# Patient Record
Sex: FEMALE | Race: WHITE | NOT HISPANIC OR LATINO | URBAN - METROPOLITAN AREA
[De-identification: names, ages, dates, MRNs, and addresses within clinical notes are randomized per-mention and may not be internally consistent; named-entity substitution may affect disease eponyms.]

---

## 2019-07-10 ENCOUNTER — INITIAL PRENATAL (OUTPATIENT)
Dept: OBGYN | Facility: CLINIC | Age: 24
End: 2019-07-10
Payer: MEDICAID

## 2019-07-10 VITALS — WEIGHT: 163 LBS | DIASTOLIC BLOOD PRESSURE: 66 MMHG | SYSTOLIC BLOOD PRESSURE: 102 MMHG

## 2019-07-10 DIAGNOSIS — N93.8 DUB (DYSFUNCTIONAL UTERINE BLEEDING): ICD-10-CM

## 2019-07-10 DIAGNOSIS — Z32.01 POSITIVE PREGNANCY TEST: ICD-10-CM

## 2019-07-10 LAB
IN CLINIC OB SCAN: NORMAL
INT CON NEG: NEGATIVE
INT CON POS: POSITIVE
POC URINE PREGNANCY TEST: POSITIVE

## 2019-07-10 PROCEDURE — 76857 US EXAM PELVIC LIMITED: CPT | Performed by: OBSTETRICS & GYNECOLOGY

## 2019-07-10 PROCEDURE — 99203 OFFICE O/P NEW LOW 30 MIN: CPT | Mod: 25 | Performed by: OBSTETRICS & GYNECOLOGY

## 2019-07-10 PROCEDURE — 81025 URINE PREGNANCY TEST: CPT | Performed by: OBSTETRICS & GYNECOLOGY

## 2019-07-10 NOTE — PROGRESS NOTES
CC: Confirmation of Pregnancy    HPI: Pt is a 22 yo  lmp unknown who presents for evaluation of amenorrhea.  She has had no bleeding since her last menses.  A urine pregnancy test was positive.  She denies vaginal spotting or pain.  She denies nausea and vomiting.  She has been feeling the baby move for 2 weeks.       ROS: negative for dizziness, SOB, chest pain, palpitations, dysuria, vaginal discharge.    /66   Wt 73.9 kg (163 lb)     GENERAL: Alert, in no apparent distress  PSYCHIATRIC: Appropriate affect, intact insight and judgement.  ABDOMEN: Soft, nontender, nondistended.  Fundus palpable at umbillicus. No hepatosplenomegaly.   No rebound or guarding.  No inguinal lymphadenopathy.  BACK: No CVA tenderness  EXTREMITIES: No edema, no calf tenderness.    GENITOURINARY:  Normal external genitalia, no lesions.  Normal urethral meatus, no masses or tenderness.  Normal bladder without fullness or masses.  Vagina well estrogenized, no vaginal discharge or lesions.  Cervix normal length, nontender.  Uterus 20 weeks, nontender.  Adnexa nontender, no masses.  Normal anus and perineum.      Limited Abdominal US - performed and interpreted by me    Single gestational sac.  Placenta posterior    [place] IUP with + fetal cardiac activity noted.    BPD = 4.52 cm, AC = 13.86 cm, FL = 3.30 cm, C/W 19+5/7 wks.    KUNAL by US 19    Ovaries and cervix grossly normal. Cervical length 3.98 cm      ASSESSMENT/PLAN:  1. DUB visit - [place] IUP at 19+5/7 wks.  Prenatal Vitamins.  F/U for NOB appointment next available..

## 2019-07-10 NOTE — PROGRESS NOTES
Pt here today for   Pt states no complaints  Reports +  Good # [phone]  Pharmacy Confirmed.

## 2019-07-19 ENCOUNTER — APPOINTMENT (OUTPATIENT)
Dept: OBGYN | Facility: CLINIC | Age: 24
End: 2019-07-19

## 2019-08-12 ENCOUNTER — INITIAL PRENATAL (OUTPATIENT)
Dept: OBGYN | Facility: CLINIC | Age: 24
End: 2019-08-12

## 2019-08-12 ENCOUNTER — TELEPHONE (OUTPATIENT)
Dept: OBGYN | Facility: CLINIC | Age: 24
End: 2019-08-12

## 2019-08-12 ENCOUNTER — HOSPITAL ENCOUNTER (OUTPATIENT)
Dept: LAB | Facility: MEDICAL CENTER | Age: 24
End: 2019-08-12
Attending: NURSE PRACTITIONER
Payer: COMMERCIAL

## 2019-08-12 ENCOUNTER — HOSPITAL ENCOUNTER (OUTPATIENT)
Facility: MEDICAL CENTER | Age: 24
End: 2019-08-12
Attending: NURSE PRACTITIONER
Payer: COMMERCIAL

## 2019-08-12 VITALS — WEIGHT: 165 LBS | SYSTOLIC BLOOD PRESSURE: 104 MMHG | DIASTOLIC BLOOD PRESSURE: 64 MMHG

## 2019-08-12 DIAGNOSIS — Z34.82 ENCOUNTER FOR SUPERVISION OF OTHER NORMAL PREGNANCY IN SECOND TRIMESTER: ICD-10-CM

## 2019-08-12 LAB
APPEARANCE UR: CLEAR
BILIRUB UR STRIP-MCNC: NORMAL MG/DL
COLOR UR AUTO: YELLOW
ERYTHROCYTE [DISTWIDTH] IN BLOOD BY AUTOMATED COUNT: 48.5 FL (ref 35.9–50)
GLUCOSE 1H P 50 G GLC PO SERPL-MCNC: 92 MG/DL (ref 70–139)
GLUCOSE UR STRIP.AUTO-MCNC: NORMAL MG/DL
HCT VFR BLD AUTO: 38 % (ref 37–47)
HGB BLD-MCNC: 12.6 G/DL (ref 12–16)
KETONES UR STRIP.AUTO-MCNC: NORMAL MG/DL
LEUKOCYTE ESTERASE UR QL STRIP.AUTO: NORMAL
MCH RBC QN AUTO: 32.2 PG (ref 27–33)
MCHC RBC AUTO-ENTMCNC: 33.2 G/DL (ref 33.6–35)
MCV RBC AUTO: 97.2 FL (ref 81.4–97.8)
NITRITE UR QL STRIP.AUTO: NORMAL
PH UR STRIP.AUTO: 7 [PH] (ref 5–8)
PLATELET # BLD AUTO: 251 K/UL (ref 164–446)
PMV BLD AUTO: 10.3 FL (ref 9–12.9)
PROT UR QL STRIP: NORMAL MG/DL
RBC # BLD AUTO: 3.91 M/UL (ref 4.2–5.4)
RBC UR QL AUTO: NORMAL
SP GR UR STRIP.AUTO: 1.02
TREPONEMA PALLIDUM IGG+IGM AB [PRESENCE] IN SERUM OR PLASMA BY IMMUNOASSAY: NON REACTIVE
UROBILINOGEN UR STRIP-MCNC: NORMAL MG/DL
WBC # BLD AUTO: 12.3 K/UL (ref 4.8–10.8)

## 2019-08-12 PROCEDURE — 8198 PREG CTR PKG RATE (SYSTEM): Performed by: NURSE PRACTITIONER

## 2019-08-12 PROCEDURE — 81002 URINALYSIS NONAUTO W/O SCOPE: CPT | Performed by: NURSE PRACTITIONER

## 2019-08-12 PROCEDURE — 0500F INITIAL PRENATAL CARE VISIT: CPT | Performed by: NURSE PRACTITIONER

## 2019-08-12 ASSESSMENT — ENCOUNTER SYMPTOMS
GASTROINTESTINAL NEGATIVE: 1
EYES NEGATIVE: 1
CARDIOVASCULAR NEGATIVE: 1
NEUROLOGICAL NEGATIVE: 1
RESPIRATORY NEGATIVE: 1
PSYCHIATRIC NEGATIVE: 1
CONSTITUTIONAL NEGATIVE: 1
MUSCULOSKELETAL NEGATIVE: 1

## 2019-08-12 NOTE — NON-PROVIDER
Patient here for GYN/DUB.  LMP= 02/21/2019   KUNAL= 11/28/2019  GA= 24 wk  Last pap:2 or 3 years ago  Phone number: [phone]  Pharmacy verified  c/o  WT: 165 lbs  BP:104/64

## 2019-08-12 NOTE — TELEPHONE ENCOUNTER
[facility] lab called states pt was drawn for her pallidum but her order is wrong.  I will pend correct order for provider to sign

## 2019-08-12 NOTE — LETTER
[facility]  Pcp Pt States None  No address on file  Fax: [phone]   Authorization for Release/Disclosure of   Protected Health Information   Name: VINNY HAYDEN : 1995 SSN: [id number]   Address: [address]  [place] [address] Phone:    [phone] (home)    I authorize the entity listed below to release/disclose the PHI below to:   [facility]/Pcp Pt States None and GALO Sosa   Provider or Entity Name:     Address   City, State, [facility]   Phone:      Fax:     Reason for request: continuity of care   Information to be released:    [  ] LAST COLONOSCOPY,  including any PATH REPORT and follow-up  [  ] LAST FIT/COLOGUARD RESULT [  ] LAST DEXA  [  ] LAST MAMMOGRAM  [  ] LAST PAP  [  ] LAST LABS [  ] RETINA EXAM REPORT  [  ] IMMUNIZATION RECORDS  [  ] Release all info      [  ] Check here and initial the line next to each item to release ALL health information INCLUDING  _____ Care and treatment for drug and / or alcohol abuse  _____ HIV testing, infection status, or AIDS  _____ Genetic Testing    DATES OF SERVICE OR TIME PERIOD TO BE DISCLOSED: _____________  I understand and acknowledge that:  * This Authorization may be revoked at any time by you in writing, except if your health information has already been used or disclosed.  * Your health information that will be used or disclosed as a result of you signing this authorization could be re-disclosed by the recipient. If this occurs, your re-disclosed health information may no longer be protected by State or Federal laws.  * You may refuse to sign this Authorization. Your refusal will not affect your ability to obtain treatment.  * This Authorization becomes effective upon signing and will  on (date) __________.      If no date is indicated, this Authorization will  one (1) year from the signature date.    Name: Vinny Hayden    Signature:   Date:     2019       PLEASE FAX REQUESTED RECORDS BACK TO: [phone]

## 2019-08-12 NOTE — PROGRESS NOTES
Subjective:      S:  Gypsy Hayden is a 23 y.o.  female  @ EGA: 24w3d KUNAL: Estimated Date of Delivery: 19  per  who presents for her new OB exam. She states she has gotten some prenatal care in [facility], pnp and early 1 hr GTT were drawn. Release of records signed. She has no complaints.  Too late for AFP, unsure if part of her initial pnp.  Declines CF.  Reports positive FM, no VB, LOF, or cramping.  Denies dysuria, vaginal DC.  Pt is single and lives with FOB. Works as , lifting restrictions discussed.  Pregnancy is unplanned but desired.  Not appropriate for Centering Pregnancy.    O:    Vitals:    19 1439   BP: 104/64   Weight: 74.8 kg (165 lb)    See H&P Prenatal Physical.  Wet mount: not indicated        FHTs: 150        Fundal ht: 24cm     A:   1.  IUP @ 24w3d KUNAL: Estimated Date of Delivery: 19 per          2.  S=D        3.    Patient Active Problem List    Diagnosis Date Noted   • Routine postpartum follow-up 03/15/2011         P:  1.  GC/CT done. Pap done.         2.  Prenatal labs pending release of records from [facility]        3.  Discussed PNV, diet, and adequate water intake        4.  NOB packet given        5.  Return to office in 4 wks        6.  Complete OB US asap    HPI    Review of Systems   Constitutional: Negative.    HENT: Negative.    Eyes: Negative.    Respiratory: Negative.    Cardiovascular: Negative.    Gastrointestinal: Negative.    Genitourinary: Negative.         Uterus enlarged, c/w 24 wk ga   Musculoskeletal: Negative.    Skin: Negative.    Neurological: Negative.    Endo/Heme/Allergies: Negative.    Psychiatric/Behavioral: Negative.           Objective:     /64   Wt 74.8 kg (165 lb)      Physical Exam   Constitutional: She is oriented to person, place, and time. She appears well-developed and well-nourished.   Neck: Normal range of motion. Neck supple.   Cardiovascular: Normal rate, regular rhythm and normal  heart sounds.   Pulmonary/Chest: Effort normal and breath sounds normal.   Abdominal: Soft.   Genitourinary: Vagina normal and uterus normal.   Musculoskeletal: Normal range of motion.   Neurological: She is alert and oriented to person, place, and time. She has normal reflexes.   Skin: Skin is warm and dry.   Psychiatric: She has a normal mood and affect. Her behavior is normal. Judgment and thought content normal.   Nursing note and vitals reviewed.              Assessment/Plan:     1. Encounter for supervision of other normal pregnancy in second trimester    - US-OB 2ND 3RD TRI COMPLETE; Future  - Pap IG, CT-NG, RFX HPV ASCU  - GLUCOSE 1HR GESTATIONAL; Future  - CBC WITHOUT DIFFERENTIAL; Future  - TREPONEMA PALLIDUM ANTIBODY, IGG BY IFA (CSF); Future

## 2019-08-13 LAB
FORWARD REASON: SPWHY: NORMAL
FORWARDED TO LAB: SPWHR: NORMAL
SPECIMEN SENT: SPWT1: NORMAL

## 2019-08-16 LAB
C TRACH RRNA CVX QL NAA+PROBE: NEGATIVE
CYTOLOGIST CVX/VAG CYTO: NORMAL
CYTOLOGY CVX/VAG DOC CYTO: NORMAL
CYTOLOGY CVX/VAG DOC THIN PREP: NORMAL
DOT  190119: NORMAL
DX ICD CODE: NORMAL
N GONORRHOEA RRNA CVX QL NAA+PROBE: NEGATIVE
NOTE  190109: NORMAL
OTHER STN SPEC: NORMAL
STAT OF ADQ CVX/VAG CYTO-IMP: NORMAL

## 2019-09-17 ENCOUNTER — ROUTINE PRENATAL (OUTPATIENT)
Dept: OBGYN | Facility: CLINIC | Age: 24
End: 2019-09-17

## 2019-09-17 VITALS — SYSTOLIC BLOOD PRESSURE: 128 MMHG | DIASTOLIC BLOOD PRESSURE: 78 MMHG | WEIGHT: 174 LBS

## 2019-09-17 DIAGNOSIS — Z34.80 SUPERVISION OF OTHER NORMAL PREGNANCY: Primary | ICD-10-CM

## 2019-09-17 PROCEDURE — 90715 TDAP VACCINE 7 YRS/> IM: CPT | Performed by: NURSE PRACTITIONER

## 2019-09-17 PROCEDURE — 90472 IMMUNIZATION ADMIN EACH ADD: CPT | Performed by: NURSE PRACTITIONER

## 2019-09-17 PROCEDURE — 90471 IMMUNIZATION ADMIN: CPT | Performed by: NURSE PRACTITIONER

## 2019-09-17 PROCEDURE — 90686 IIV4 VACC NO PRSV 0.5 ML IM: CPT | Performed by: NURSE PRACTITIONER

## 2019-09-17 PROCEDURE — 90040 PR PRENATAL FOLLOW UP: CPT | Performed by: NURSE PRACTITIONER

## 2019-09-17 NOTE — PROGRESS NOTES
Pt here today for OB follow up  Pt states no complaints  Reports +FM  Pharmacy Confirmed.  Chaperone offered and declined  tdap and flu today

## 2019-09-17 NOTE — LETTER
"Count Your Baby's Movements  Another step to a healthy delivery    A Epic Dress Re Test             Dept: [phone]    How Many Weeks Pregnant? 29w4d    Date to Begin Countin2019                How to use this chart    One way for your physician to keep track of your baby's health is by knowing how often the baby moves (or \"kicks\") in your womb.  You can help your physician to do this by using this chart every day.    Every day, you should see how many hours it takes for your baby to move 10 times.  Start in the morning, as soon as you get up.    · First, write down the time your baby moves until you get to 10.  · Check off one box every time your baby moves until you get to 10.  · Write down the time you finished counting in the last column.  · Total how long it took to count up all 10 movements.  · Finally, fill in the box that shows how long this took.  After counting 10 movements, you no longer have to count any more that day.  The next morning, just start counting again as soon as you get up.    What should you call a \"movement\"?  It is hard to say, because it will feel different from one mother to another and from one pregnancy to the next.  The important thing is that you count the movements the same way throughout your pregnancy.  If you have more questions, you should ask your physician.    Count carefully every day!  SAMPLE:  Week 28    How many hours did it take to feel 10 movements?       Start  Time     1     2     3     4     5     6     7     8     9     10   Finish Time   Mon 8:20 ·  ·  ·  ·  ·  ·  ·  ·  ·  ·  11:40                  Sat               Sun                 IMPORTANT: You should contact your physician if it takes more than two hours for you to feel 10 movements.  Each morning, write down the time and start to count the movements of your baby.  Keep track by checking off one box every time you feel one movement.  When you " "have felt 10 \"kicks\", write down the time you finished counting in the last column.  Then fill in the   box (over the check patricia) for the number of hours it took.  Be sure to read the complete instructions on the previous page.            "

## 2019-09-17 NOTE — PROGRESS NOTES
S:  Pt is  at 29w4d for routine OB follow up.  No c/o today.  Reports good FM.  Denies VB, LOF, RUCs or vaginal DC.    O:  Please see above vitals.        FHTs: 155        Fundal ht: 29 cm.    A:  IUP at 29w4d  Patient Active Problem List    Diagnosis Date Noted   • Supervision of other normal pregnancy 2019        P:  1.  PP contraception Nexplanon.          2.  Instructions given on FKCs.          3.  Questions answered.          4.  Encouraged pt to tour L&D.          5.  Encourage adequate water intake.        6.  1hr GTT, syphilis and H/H wnl - reviewed w pt.        7.   labor precautions reviewed.         8.  F/u 2 wks.        9.  TDap and flu given.      10.  Keep US appt tomorrow.       11.  Records requested again.    I have placed the below orders and discussed them with an approved delegating provider. The MA is performing the below orders under the direction of  Dr. Singh.

## 2019-09-17 NOTE — PATIENT INSTRUCTIONS
P:  1.  PP contraception Nexplanon.          2.  Instructions given on FKCs.          3.  Questions answered.          4.  Encouraged pt to tour L&D.          5.  Encourage adequate water intake.        6.  1hr GTT, syphilis and H/H wnl - reviewed w pt.        7.   labor precautions reviewed.         8.  F/u 2 wks.        9.  TDap and flu given.      10.  Keep US appt tomorrow.       11.  Records requested again.

## 2019-09-18 ENCOUNTER — APPOINTMENT (OUTPATIENT)
Dept: RADIOLOGY | Facility: IMAGING CENTER | Age: 24
End: 2019-09-18
Attending: NURSE PRACTITIONER

## 2019-09-18 DIAGNOSIS — Z34.82 ENCOUNTER FOR SUPERVISION OF OTHER NORMAL PREGNANCY IN SECOND TRIMESTER: ICD-10-CM

## 2019-09-18 PROCEDURE — 76805 OB US >/= 14 WKS SNGL FETUS: CPT | Performed by: OBSTETRICS & GYNECOLOGY

## 2019-10-01 ENCOUNTER — ROUTINE PRENATAL (OUTPATIENT)
Dept: OBGYN | Facility: CLINIC | Age: 24
End: 2019-10-01

## 2019-10-01 VITALS — SYSTOLIC BLOOD PRESSURE: 126 MMHG | DIASTOLIC BLOOD PRESSURE: 80 MMHG | WEIGHT: 178 LBS

## 2019-10-01 DIAGNOSIS — Z34.80 SUPERVISION OF OTHER NORMAL PREGNANCY: Primary | ICD-10-CM

## 2019-10-01 DIAGNOSIS — O98.113 SYPHILIS IN PREGNANCY, ANTEPARTUM, THIRD TRIMESTER: ICD-10-CM

## 2019-10-01 PROCEDURE — 90040 PR PRENATAL FOLLOW UP: CPT | Performed by: NURSE PRACTITIONER

## 2019-10-01 NOTE — PROGRESS NOTES
"S:  Pt is  at 31w4d for routine OB follow up.  No c/o today.  Reports that she was tested for STIs at her prior doctor's office.  She reports she was told that she was \"non-reactive\" for \"something\" but \"positive\" for something else.  Reports that her provider gave her a shot to treat.  Reports she was supposed to have f/u bloodwork done, but moved.  .  Reports good FM.  Denies VB, LOF, RUCs or vaginal DC.    O:  Please see above vitals.        FHTs: 150        Fundal ht: 31 cm.    A:  IUP at 31w4d  Patient Active Problem List    Diagnosis Date Noted   • Supervision of other normal pregnancy 2019        P:  1.  GBS @ 36 wks.          2.  Continue FKCs.          3.  Questions answered.          4.  Encouraged pt to tour L&D.          5.  Encourage adequate water intake.        6.  F/u 2 wks.        7.  Records requested.    "

## 2019-10-01 NOTE — PROGRESS NOTES
Pt. Here for OB/FU. Reports Good FM.   Kick count sheet given today along with instructions.   Good # [phone]  Pt. State no complaints.   Pharmacy verified.

## 2019-10-01 NOTE — PATIENT INSTRUCTIONS
P:  1.  GBS @ 36 wks.          2.  Continue FKCs.          3.  Questions answered.          4.  Encouraged pt to tour L&D.          5.  Encourage adequate water intake.        6.  F/u 2 wks.        7.  Records requested.

## 2019-10-01 NOTE — LETTER
"Count Your Baby's Movements  Another step to a healthy delivery    Gypsy Catracho              Dept: [phone]    How Many Weeks Pregnant 31w4d    Date to Begin Counting: 10/1/19              How to use this chart    One way for your physician to keep track of your baby's health is by knowing how often the baby moves (or \"kicks\") in your womb.  You can help your physician to do this by using this chart every day.    Every day, you should see how many hours it takes for your baby to move 10 times.  Start in the morning, as soon as you get up.    · First, write down the time your baby moves until you get to 10.  · Check off one box every time your baby moves until you get to 10.  · Write down the time you finished counting in the last column.  · Total how long it took to count up all 10 movements.  · Finally, fill in the box that shows how long this took.  After counting 10 movements, you no longer have to count any more that day.  The next morning, just start counting again as soon as you get up.    What should you call a \"movement\"?  It is hard to say, because it will feel different from one mother to another and from one pregnancy to the next.  The important thing is that you count the movements the same way throughout your pregnancy.  If you have more questions, you should ask your physician.    Count carefully every day!  SAMPLE:  Week 28    How many hours did it take to feel 10 movements?       Start  Time     1     2     3     4     5     6     7     8     9     10   Finish Time   Mon 8:20 ·  ·  ·  ·  ·  ·  ·  ·  ·  ·  11:40   Tue Wed Thu Fri               Sat               Sun                 IMPORTANT: You should contact your physician if it takes more than two hours for you to feel 10 movements.  Each morning, write down the time and start to count the movements of your baby.  Keep track by checking off one box every time you feel one movement.  When you have felt " "10 \"kicks\", write down the time you finished counting in the last column.  Then fill in the   box (over the check patricia) for the number of hours it took.  Be sure to read the complete instructions on the previous page.            "

## 2019-10-15 ENCOUNTER — ROUTINE PRENATAL (OUTPATIENT)
Dept: OBGYN | Facility: CLINIC | Age: 24
End: 2019-10-15

## 2019-10-15 VITALS — DIASTOLIC BLOOD PRESSURE: 76 MMHG | WEIGHT: 179 LBS | SYSTOLIC BLOOD PRESSURE: 124 MMHG

## 2019-10-15 DIAGNOSIS — Z34.80 SUPERVISION OF OTHER NORMAL PREGNANCY: ICD-10-CM

## 2019-10-15 PROCEDURE — 90040 PR PRENATAL FOLLOW UP: CPT | Performed by: PHYSICIAN ASSISTANT

## 2019-10-15 NOTE — PROGRESS NOTES
Pt has no complaints with cramping, UCs, Vb, LOF. +FM. Pt has had vag itching, likely some swelling in labia, so will try cool packs. Wet mt: Neg. PTL precautions reviewed. No PNL on record, even after requesting last visit, so will have pt repeat today. Declines BTL. RTC 2 wk or sooner prn.

## 2019-10-15 NOTE — PROGRESS NOTES
OB follow up   + fetal movement.  No VB, LOF or UC's.  Wt: 179lb      BP:124/76Phone # [phone]  Preferred pharmacy confirmed.  BTL declined  C/ vaginal itching.  No previous records received yet.

## 2019-10-17 ENCOUNTER — HOSPITAL ENCOUNTER (OUTPATIENT)
Facility: MEDICAL CENTER | Age: 24
End: 2019-10-17
Attending: OBSTETRICS & GYNECOLOGY | Admitting: OBSTETRICS & GYNECOLOGY
Payer: MEDICAID

## 2019-10-17 VITALS
RESPIRATION RATE: 16 BRPM | SYSTOLIC BLOOD PRESSURE: 116 MMHG | DIASTOLIC BLOOD PRESSURE: 78 MMHG | TEMPERATURE: 97.8 F | HEART RATE: 98 BPM | OXYGEN SATURATION: 94 %

## 2019-10-17 LAB
APPEARANCE UR: CLEAR
COLOR UR AUTO: YELLOW
GLUCOSE UR QL STRIP.AUTO: NEGATIVE MG/DL
KETONES UR QL STRIP.AUTO: NEGATIVE MG/DL
LEUKOCYTE ESTERASE UR QL STRIP.AUTO: NEGATIVE
NITRITE UR QL STRIP.AUTO: NEGATIVE
PH UR STRIP.AUTO: 7 [PH] (ref 5–8)
PROT UR QL STRIP: NEGATIVE MG/DL
RBC UR QL AUTO: NEGATIVE
SP GR UR: 1.01 (ref 1–1.03)

## 2019-10-17 PROCEDURE — 99213 OFFICE O/P EST LOW 20 MIN: CPT | Mod: 25 | Performed by: OBSTETRICS & GYNECOLOGY

## 2019-10-17 PROCEDURE — 59025 FETAL NON-STRESS TEST: CPT | Performed by: OBSTETRICS & GYNECOLOGY

## 2019-10-17 PROCEDURE — 59025 FETAL NON-STRESS TEST: CPT

## 2019-10-17 PROCEDURE — 81002 URINALYSIS NONAUTO W/O SCOPE: CPT

## 2019-10-17 NOTE — DISCHARGE INSTRUCTIONS
General Instructions:  · If you think you are in labor, time contractions (lying on your left side) from the beginning of one contraction to the beginning of the next contraction for at least one hour.  · Increase fluid intake: you should consume 10-12 8 oz glasses of non-caffeinated fluid per day.  · Report any pressure or burning on urination to your physician.  · Monitor fetal movement: If you notice an absence or decrease in fetal movement, drink a large glass of water and rest on your side.  If there is no increase in movement, call your physician or go to the hospital for further evaluation.  · Report any sudden, sharp abdominal pain.  · Report any bleeding.  Spotting or pinkish discharge is normal after vaginal exam.  You may also spot after sexual intercourse.    Pre-term Labor (<37 weeks):  Call your physician or return to the hospital if:  · You have painless regular contractions more than 4 in one hour.  · Your water breaks (remember time and color).  · You have menstrual-like cramps, a low dull backache or pressure in your pelvis or back.  · Your baby does not move enough to complete the daily kick count (10 movements in 2 hours).  · Your baby moves much less often than on the days before or you have not felt your baby move all day.  · Please review the MEDICATION LIST section of your AFTER VISIT SUMMARY document.  · Take your medication as prescribed    May take Robitussin or Robitussin DM for cough, cold medications with Tylenol, stay hydrated, try tea and honey for sore throat  Other Instructions:  Please carefully review your entire AFTER VISIT SUMMARY document for all discharge instructions.

## 2019-10-17 NOTE — PROGRESS NOTES
EDC  32      1300-pt presents from home with c/o SOB and transient dizziness at work, she also states that she has a cough and some cold s/s, no c/o leaking, bleeding, pain or uc's, states baby is moving but less than normal, placed on external monitors, vs taken, lung fields all sound clear  1315-report given to Dr Gee, will be in to see pt  1320-Dr Gee in room with resident, assessment done, wants pt to hydrate orally, discussed with pt cold treatments  1405-pt feeling better, feeling normal movement now, report given to Dr Gee, discharge order received  1410-pt discharged home with PTL precautions and reviewed what she may take for a cold, verbalized understanding, left ambulatory with SO

## 2019-10-17 NOTE — H&P
Obstetrics & Gynecology History & Physical Note    Date of Service  10/17/2019    Chief Complaint  Dizziness and shortness of breath at work    History of Presenting Illness  Gypsy Hitchcock is a 24 y.o.  at 33w6d 2019, by Ultrasound. No LMP recorded. Patient is pregnant.  Patient presents with complaints of shortness of breath and transient episode of dizziness at work.  Currently she states most of her symptoms have resolved.  She reports some chest congestion symptoms and states she has had some cold symptoms for the past 2 to 3 days.  She denies any fevers.  She has a dry cough.  She reports normal fetal movements with no contractions cramping bleeding or leaking.  Denies headaches or scotoma.  She denies any chest pain.  Patient is currently lying comfortably in bed without any signs of distress.    Prenatal care is at [facility] and is complicated by:  1.     Patient Active Problem List    Diagnosis Date Noted   • Syphilis in pregnancy, antepartum, third trimester 10/01/2019   • Supervision of other normal pregnancy 2019       Obstetric History  OB History    Para Term  AB Living   2 1 1     1   SAB TAB Ectopic Molar Multiple Live Births             1      # Outcome Date GA Lbr Andre/2nd Weight Sex Delivery Anes PTL Lv   2 Current            1 Term 11 39w5d  3.558 kg (7 lb 13.5 oz) F Vag-Spont OTHER N ESTELLA      Birth Comments:  cervical lac repaired 1 stitch, 3rd degree perineal lac repaired.       Gynecologic History  negative    Review of Systems  ROS all organ systems were reviewed and were negative except for complaints in HPI    Medical History   has no past medical history of Addisons disease ([facility]), Adrenal disorder (HCC), Allergy, Anemia, Anxiety, Arrhythmia, Arthritis, Asthma, Blood transfusion without reported diagnosis, Cancer ([facility]), Cataract, CHF (congestive heart failure) ([facility]), Clotting disorder (HCC), COPD (chronic obstructive pulmonary disease) ([facility]), Cushings  syndrome (HCC), Depression, Diabetes (HCC), Diabetic neuropathy (HCC), GERD (gastroesophageal reflux disease), Glaucoma, Goiter, Head ache, Heart attack (HCC), Heart murmur, HIV (human immunodeficiency virus infection) (HCC), Hyperlipidemia, Hypertension, IBD (inflammatory bowel disease), Kidney disease, Meningitis, Migraine, Muscle disorder, Osteoporosis, Parathyroid disorder (HCC), Pituitary disease (HCC), Pulmonary emphysema (HCC), Seizure (HCC), Sickle cell disease (HCC), Stroke (HCC), Substance abuse (HCC), Thyroid disease, Tuberculosis, or Urinary tract infection.    Surgical History   has no past surgical history on file.     Family History  family history is not on file.     Social History   reports that she has never smoked. She has never used smokeless tobacco. She reports that she does not drink alcohol or use drugs.    Allergies  No Known Allergies    Medications  Prior to Admission Medications   Prescriptions Last Dose Informant Patient Reported? Taking?   Prenatal Vit-Fe Sulfate-FA (PRENATAL VITAMIN PO)   Yes No   Sig: Take  by mouth.      Facility-Administered Medications: None       Physical Exam  T97.8 P98 RR16 Pulse Ox 94-97 %    General:   no acute distress, alert, cooperative, no distress   Skin:   normal   HEENT:  PERRLA and extraocular movements intact   Lungs:   CTA bilateral, clear to auscultation bilaterally   Heart:   S1, S2 normal, no murmur, click, rub or gallop, regular rate and rhythm, regular rate and rhythm   Breasts:   deferred   Abdomen:  Abdomen soft, non-tender; gravid.   Pelvis: Exam deferred.   Neck: + lymphadenopathy  Throat: + erythema  EXT: no c/c/e or calf pain  Neuro: no gross deficits    NST:  NST per my read:  Indications: shortness of breath/dizziness    FHR baseline: 140s  Accelerations present without decelerations  Moderate FHR variability present  [place]: no is a residentcontractions noted  NST is reactive    Laboratory:  Prenatal Results     General (Most Recent  Result)     Test Value Reference Range Date Time    ABO O   08/20/10 1114    Rh POS   08/20/10 1114    Antibody screen NEG   08/20/10 1114    HbA1c        Gonorrhea        Chlamydia  by PCR        Chlamydia by DNA negative   08/23/11     RPR/Syphilus Non Reactive  Non Reactive 08/12/19 1633    HSV 1/2 by PCR (non-serum)        HSV 1/2 (serum)        HSV 1        HSV 2        HPV (16)        HPV (18)        HPV (other)        HBsAg Negative  Negative 08/20/10 1114    HIV-1 HIV-2 Antibodies        Rubella 48.20 IU/mL  08/20/10 1114    Tb              Pap Smear (Most Recent Result)     Test Value Reference Range Date Time    Pap smear        Pap smear w/HPV        Pap smear w/CTNG        Pap smar w/HPV CTNG        Pap smear (reflex HPV ACUS)        Pap smear (reflex HPV ASCUS w/CTNG)        Pathology gyn specimen              Urinalysis (Most Recent Result)     Test Value Reference Range Date Time    Urinalysis        Urinalysis (culture if indicated)        POC urinalysis  (See Report)    08/12/19 1448    Urine drug screen        Urine drug screen (w/o conf)        Urine culture ([id number])        Urine culture ([id number])              1st Trimester     Test Value Reference Range Date Time    Hgb        Hct        Fasting Glucose Tolerance        GTT, 1 hour        GTT, 2 hours        GTT, 3 hours              2nd Trimester     Test Value Reference Range Date Time    Hgb 12.6 g/dL 12.0 - 16.0 08/12/19 1633    Hct 38.0 % 37.0 - 47.0 08/12/19 1633    Urinalysis        Urine Culture        AST        ALT        Uric Acid        Fasting Glucose Tolerance        GTT, 1 hour        GTT, 2 hours        GTT, 3 hours        Urine Protein/Creatinine Ratio              3rd Trimester     Test Value Reference Range Date Time    Hgb        Hct        Platelet count        GBS (SEXTON BROTH)        GBS (Direct)        Urinalysis        Urine Culture        Urine Drug Screen        Urine Protein/Creatinine Ratio              Congenital  Disease Screening     Test Value Reference Range Date Time    First Trimester Screen        Quad Screen        Sickle Cell        Thalassemia        [facility]        Cystic Fibrosis Carrier Study                      Urinalysis:    Urine analysis is negative          Assessment:  24 y.o.  33w6d who presents with:  Transient dizziness-now resolved  Transient shortness of breath-symptoms resolved  Common cold symptoms.  Patient is afebrile  Reactive nonstress test    Plan:  Discharge planning and precautions were reviewed  We discussed use of common cold products for symptoms  We discussed physiologic changes in pregnancy and associated symptoms  I reviewed abnormal symptoms and signs of distress that needs evaluation  Patient discharged in a stable fashion  Patient has appointment in pregnancy center this week  Patient to return to labor and delivery if symptoms worsens    Gurdeep Gee M.D.

## 2019-10-22 ENCOUNTER — HOSPITAL ENCOUNTER (OUTPATIENT)
Dept: LAB | Facility: MEDICAL CENTER | Age: 24
End: 2019-10-22
Attending: PHYSICIAN ASSISTANT
Payer: COMMERCIAL

## 2019-10-22 ENCOUNTER — HOSPITAL ENCOUNTER (OUTPATIENT)
Facility: MEDICAL CENTER | Age: 24
End: 2019-10-22
Attending: OBSTETRICS & GYNECOLOGY | Admitting: OBSTETRICS & GYNECOLOGY
Payer: MEDICAID

## 2019-10-22 VITALS — HEIGHT: 62 IN | WEIGHT: 178 LBS | TEMPERATURE: 97.6 F | BODY MASS INDEX: 32.76 KG/M2

## 2019-10-22 DIAGNOSIS — Z34.80 SUPERVISION OF OTHER NORMAL PREGNANCY: ICD-10-CM

## 2019-10-22 LAB
ABO GROUP BLD: NORMAL
BACTERIA #/AREA URNS HPF: ABNORMAL /HPF
BLD GP AB SCN SERPL QL: NORMAL
CRYSTALS AMN MICRO: NORMAL
EPI CELLS #/AREA URNS HPF: ABNORMAL /HPF
HYALINE CASTS #/AREA URNS LPF: ABNORMAL /LPF
RBC # URNS HPF: ABNORMAL /HPF
RH BLD: NORMAL
WBC #/AREA URNS HPF: ABNORMAL /HPF

## 2019-10-22 PROCEDURE — 59025 FETAL NON-STRESS TEST: CPT

## 2019-10-22 PROCEDURE — 89060 EXAM SYNOVIAL FLUID CRYSTALS: CPT

## 2019-10-22 ASSESSMENT — PAIN SCALES - GENERAL: PAINLEVEL: 0 - NO PAIN

## 2019-10-23 LAB
APPEARANCE UR: ABNORMAL
BASOPHILS # BLD AUTO: 0.3 % (ref 0–1.8)
BASOPHILS # BLD: 0.03 K/UL (ref 0–0.12)
BILIRUB UR QL STRIP.AUTO: NEGATIVE
COLOR UR: YELLOW
EOSINOPHIL # BLD AUTO: 0.04 K/UL (ref 0–0.51)
EOSINOPHIL NFR BLD: 0.3 % (ref 0–6.9)
ERYTHROCYTE [DISTWIDTH] IN BLOOD BY AUTOMATED COUNT: 47.3 FL (ref 35.9–50)
GLUCOSE UR STRIP.AUTO-MCNC: NEGATIVE MG/DL
HBV SURFACE AG SER QL: NEGATIVE
HCT VFR BLD AUTO: 41 % (ref 37–47)
HGB BLD-MCNC: 13.5 G/DL (ref 12–16)
HIV 1+2 AB+HIV1 P24 AG SERPL QL IA: NON REACTIVE
IMM GRANULOCYTES # BLD AUTO: 0.09 K/UL (ref 0–0.11)
IMM GRANULOCYTES NFR BLD AUTO: 0.8 % (ref 0–0.9)
KETONES UR STRIP.AUTO-MCNC: 15 MG/DL
LEUKOCYTE ESTERASE UR QL STRIP.AUTO: ABNORMAL
LYMPHOCYTES # BLD AUTO: 2.19 K/UL (ref 1–4.8)
LYMPHOCYTES NFR BLD: 18.9 % (ref 22–41)
MCH RBC QN AUTO: 32.5 PG (ref 27–33)
MCHC RBC AUTO-ENTMCNC: 32.9 G/DL (ref 33.6–35)
MCV RBC AUTO: 98.8 FL (ref 81.4–97.8)
MICRO URNS: ABNORMAL
MONOCYTES # BLD AUTO: 0.7 K/UL (ref 0–0.85)
MONOCYTES NFR BLD AUTO: 6 % (ref 0–13.4)
NEUTROPHILS # BLD AUTO: 8.53 K/UL (ref 2–7.15)
NEUTROPHILS NFR BLD: 73.7 % (ref 44–72)
NITRITE UR QL STRIP.AUTO: NEGATIVE
NRBC # BLD AUTO: 0.02 K/UL
NRBC BLD-RTO: 0.2 /100 WBC
PH UR STRIP.AUTO: 7 [PH] (ref 5–8)
PLATELET # BLD AUTO: 239 K/UL (ref 164–446)
PMV BLD AUTO: 10.6 FL (ref 9–12.9)
PROT UR QL STRIP: NEGATIVE MG/DL
RBC # BLD AUTO: 4.15 M/UL (ref 4.2–5.4)
RBC UR QL AUTO: NEGATIVE
RUBV AB SER QL: 57.4 IU/ML
SP GR UR STRIP.AUTO: 1.02
TREPONEMA PALLIDUM IGG+IGM AB [PRESENCE] IN SERUM OR PLASMA BY IMMUNOASSAY: NON REACTIVE
UROBILINOGEN UR STRIP.AUTO-MCNC: 1 MG/DL
WBC # BLD AUTO: 11.6 K/UL (ref 4.8–10.8)

## 2019-10-23 NOTE — PROGRESS NOTES
" EDC = 34.4weeks gestations here with c/o SROM. She states she was in the shower and got out to dry off and \"it just kept coming out.\" She laid down for 45 mins and got up again and more fluid came out. EFM/TOCO placed, VSS, -Cont/ +LOF/-VB/ +FM. Raissa collected and sent.  : Raissa Negative  : Report given to Philip ESTRADA. OK to DC home with PTL precautions.  : Discharge paperwork reviewed with pt and FOB. All questions answered at this time. Pt ambulated off unit in stable condition.  "

## 2019-10-29 ENCOUNTER — ROUTINE PRENATAL (OUTPATIENT)
Dept: OBGYN | Facility: CLINIC | Age: 24
End: 2019-10-29

## 2019-10-29 ENCOUNTER — HOSPITAL ENCOUNTER (OUTPATIENT)
Facility: MEDICAL CENTER | Age: 24
End: 2019-10-29
Attending: PHYSICIAN ASSISTANT
Payer: COMMERCIAL

## 2019-10-29 VITALS — DIASTOLIC BLOOD PRESSURE: 70 MMHG | BODY MASS INDEX: 33.47 KG/M2 | SYSTOLIC BLOOD PRESSURE: 110 MMHG | WEIGHT: 183 LBS

## 2019-10-29 DIAGNOSIS — Z34.80 SUPERVISION OF OTHER NORMAL PREGNANCY: ICD-10-CM

## 2019-10-29 PROCEDURE — 90040 PR PRENATAL FOLLOW UP: CPT | Performed by: PHYSICIAN ASSISTANT

## 2019-10-29 NOTE — PROGRESS NOTES
OB follow up   + fetal movement.  No VB,UC's.  Pt states some LOF last week, but none since then.  Wt: 183      BP:110/70  Phone # [phone]  Preferred pharmacy confirmed.  GBS today

## 2019-10-29 NOTE — PROGRESS NOTES
Pt has no complaints with cramping, UCs, Vb, LOF, though pt was seen in L&D for LOF last week but sent home. +FM. GBS done today. PNL wnl - pt notified of results. Cervix: Cl/50/floating. BSUS done today - vtx position confirmed. Fundal height>dates today, but likely fetal position and full bladder noted on scan. Labor precautions reviewed. Daily FKC and walks recommended. RTC 1 wk or sooner prn.

## 2019-10-31 LAB — GP B STREP DNA SPEC QL NAA+PROBE: NEGATIVE

## 2019-11-06 ENCOUNTER — ROUTINE PRENATAL (OUTPATIENT)
Dept: OBGYN | Facility: CLINIC | Age: 24
End: 2019-11-06

## 2019-11-06 VITALS — BODY MASS INDEX: 34.02 KG/M2 | DIASTOLIC BLOOD PRESSURE: 70 MMHG | SYSTOLIC BLOOD PRESSURE: 114 MMHG | WEIGHT: 186 LBS

## 2019-11-06 DIAGNOSIS — Z34.80 SUPERVISION OF OTHER NORMAL PREGNANCY: Primary | ICD-10-CM

## 2019-11-06 PROCEDURE — 90040 PR PRENATAL FOLLOW UP: CPT | Performed by: NURSE PRACTITIONER

## 2019-11-07 NOTE — PATIENT INSTRUCTIONS
P:  1.  Continue FKCs.         2.  Labor precautions given.  Instructions given on where to go.  Pt receptive to education.         3.  Reviewed GBS status w pt.       4.  Questions answered.         5.  Encouraged adequate water intake       6.  F/u 1wk

## 2019-11-07 NOTE — PROGRESS NOTES
Pt here today for OB follow up  Pt states having pressure  Reports +FM  Good # [phone]  Pharmacy Confirmed.  GBS neg

## 2019-11-07 NOTE — PROGRESS NOTES
S:  Pt is  at 36w5d here for routine OB follow up.  No c/o today.  Reports good FM.  Denies VB, LOF, RUCs, or vaginal DC.     O:  Please see above vitals.        FHTs: 150        Fundal ht: 36        Fetal position: vertex        SVE: deferred        GBS neg on 10/29/19 -- reviewed w pt.      A:  IUP at 36w5d  Patient Active Problem List    Diagnosis Date Noted   • Syphilis in pregnancy, antepartum, third trimester 10/01/2019   • Supervision of other normal pregnancy 2019       P:  1.  Continue FKCs.         2.  Labor precautions given.  Instructions given on where to go.  Pt receptive to education.         3.  Reviewed GBS status w pt.       4.  Questions answered.         5.  Encouraged adequate water intake       6.  F/u 1wk

## 2019-11-13 ENCOUNTER — HOSPITAL ENCOUNTER (OUTPATIENT)
Facility: MEDICAL CENTER | Age: 24
End: 2019-11-13
Attending: OBSTETRICS & GYNECOLOGY | Admitting: OBSTETRICS & GYNECOLOGY
Payer: MEDICAID

## 2019-11-13 VITALS
HEART RATE: 118 BPM | RESPIRATION RATE: 19 BRPM | DIASTOLIC BLOOD PRESSURE: 77 MMHG | WEIGHT: 186 LBS | BODY MASS INDEX: 34.23 KG/M2 | TEMPERATURE: 97.6 F | SYSTOLIC BLOOD PRESSURE: 119 MMHG | HEIGHT: 62 IN

## 2019-11-13 LAB
APPEARANCE UR: CLEAR
COLOR UR AUTO: YELLOW
FLUAV RNA SPEC QL NAA+PROBE: NEGATIVE
FLUBV RNA SPEC QL NAA+PROBE: NEGATIVE
GLUCOSE UR QL STRIP.AUTO: NEGATIVE MG/DL
KETONES UR QL STRIP.AUTO: NEGATIVE MG/DL
LEUKOCYTE ESTERASE UR QL STRIP.AUTO: ABNORMAL
NITRITE UR QL STRIP.AUTO: NEGATIVE
PH UR STRIP.AUTO: 5.5 [PH] (ref 5–8)
PROT UR QL STRIP: NEGATIVE MG/DL
RBC UR QL AUTO: NEGATIVE
SP GR UR: 1.01 (ref 1–1.03)

## 2019-11-13 PROCEDURE — 81002 URINALYSIS NONAUTO W/O SCOPE: CPT

## 2019-11-13 PROCEDURE — 99214 OFFICE O/P EST MOD 30 MIN: CPT | Mod: 25 | Performed by: OBSTETRICS & GYNECOLOGY

## 2019-11-13 PROCEDURE — 59025 FETAL NON-STRESS TEST: CPT | Mod: 26 | Performed by: OBSTETRICS & GYNECOLOGY

## 2019-11-13 PROCEDURE — 700111 HCHG RX REV CODE 636 W/ 250 OVERRIDE (IP): Performed by: OBSTETRICS & GYNECOLOGY

## 2019-11-13 PROCEDURE — 87502 INFLUENZA DNA AMP PROBE: CPT

## 2019-11-13 PROCEDURE — 59025 FETAL NON-STRESS TEST: CPT

## 2019-11-13 RX ORDER — ONDANSETRON 4 MG/1
4 TABLET, ORALLY DISINTEGRATING ORAL EVERY 6 HOURS PRN
Qty: 20 TAB | Refills: 0 | Status: ON HOLD | OUTPATIENT
Start: 2019-11-13 | End: 2019-11-27

## 2019-11-13 RX ORDER — ONDANSETRON 4 MG/1
8 TABLET, ORALLY DISINTEGRATING ORAL ONCE
Status: COMPLETED | OUTPATIENT
Start: 2019-11-13 | End: 2019-11-13

## 2019-11-13 RX ADMIN — ONDANSETRON 8 MG: 4 TABLET, ORALLY DISINTEGRATING ORAL at 19:43

## 2019-11-13 ASSESSMENT — PAIN SCALES - GENERAL: PAINLEVEL: 0 - NO PAIN

## 2019-11-14 NOTE — PROGRESS NOTES
"S: Pt is a 24 y.o.  at 37w5d with Estimated Date of Delivery: 19 who presents to triage c/o nausea and loose stools for the past 2 weeks.  She states this past weekend she was in the emergency room with her family members and they all tested positive for influenza.  Patient did get her influenza shot this year however she has been feeling unwell and admits to some difficulty taking deep breaths. Denies fever and chills. Has occasional cough.   No VB, RUCs, LOF.  Reports normal FM.  Has occasional cramping when the fetus moves.    O: /77   Pulse (!) 118   Temp 36.4 °C (97.6 °F) (Temporal)   Ht 1.575 m (5' 2\")   Wt 84.4 kg (186 lb)   BMI 34.02 kg/m²          NST: Done and read by me         Indication: cramping and nausea       FHR: 135       Variability: moderate       Acclerations: yes       Decelerations: no       Reactive: yes         [place]: no UCs; irritability       SVE: 1-2 / 70 / -3         A/P  Patient Active Problem List    Diagnosis Date Noted   • Syphilis in pregnancy, antepartum, third trimester 10/01/2019   • Supervision of other normal pregnancy 2019       1.  IUP @ 37w5d here with nausea vomiting and loose stools.  Patient denies any abnormal foods.  However she does have family members who have positive flu.  Will send for influenza swab   2.  Reactive NST.  3.  Zofran ODT as needed nausea. Rest and hydration advised        Update at 2030  Influenza A and B negative. Advised rest and hydration.   Zofran ODT prn  May take immodium prn      Evaluation and clinical decision making , including analysis of Fetal data and maternal lab work completed over a 1 hour period.       Doc Peterson DO        "

## 2019-11-14 NOTE — PROGRESS NOTES
Pt presents to L&D with complaints of cough, nausea and decreased FM. Pt taken to S234 for assessment.     1922 TOCO and EFM applied, VSS. Pt states that last week she was around her cousins that were sick. Pt states she had a cough and sore throat but that has gotten better. However she still feels some tightness in her chest. Pt does not appear to be in any immediate distress. Pt states this morning she started to feel nauseous but has not thrown up. Baby also has not been as active as normal.     1930 Dr. Peterson updated on pt status, orders received.     1935 SVE 1-2/50/-3. Orders to obtain flu swab received.     2038 Dr. Peterson updated on pt status, flu negative, orders for discharge received. Pt to be sent home with a prescription for zofran.     2045 RN at bedside, pt educated on negative flu and prescription. Labor precautions given and all questions answered.

## 2019-11-15 ENCOUNTER — ROUTINE PRENATAL (OUTPATIENT)
Dept: OBGYN | Facility: CLINIC | Age: 24
End: 2019-11-15

## 2019-11-15 VITALS — DIASTOLIC BLOOD PRESSURE: 74 MMHG | SYSTOLIC BLOOD PRESSURE: 110 MMHG | WEIGHT: 187 LBS | BODY MASS INDEX: 34.2 KG/M2

## 2019-11-15 DIAGNOSIS — Z34.80 SUPERVISION OF OTHER NORMAL PREGNANCY: ICD-10-CM

## 2019-11-15 PROCEDURE — 90040 PR PRENATAL FOLLOW UP: CPT | Performed by: OBSTETRICS & GYNECOLOGY

## 2019-11-15 NOTE — PROGRESS NOTES
Pt here today for OB follow up  Pt states she is SOB, having some slight cramping and nausea   Reports +FM  Good # [phone]  Pharmacy Confirmed.  Chaperone offered and not indicated.   GBS negative

## 2019-11-15 NOTE — PROGRESS NOTES
Patient is at 38w0d .no complaints  Fetal Movement : positive       Patients' weight gain, fluid intake and exercise level discussed.Vitals, fundal height , fetal position, and FHR reviewed on flowsheet.    .../74   Wt 84.8 kg (187 lb)   BMI 34.20 kg/m²   No past medical history on file.  Patient Active Problem List    Diagnosis Date Noted   • Syphilis in pregnancy, antepartum, third trimester 10/01/2019   • Supervision of other normal pregnancy 09/17/2019         Lab:  Recent Results (from the past 336 hour(s))   POC UA    Collection Time: 11/13/19  7:27 PM   Result Value Ref Range    POC Color Yellow     POC Appearance Clear     POC Glucose Negative Negative mg/dL    POC Ketones Negative Negative mg/dL    POC Specific Gravity 1.015 1.005 - 1.030    POC Blood Negative Negative    POC Urine PH 5.5 5.0 - 8.0    POC Protein Negative Negative mg/dL    POC Nitrites Negative Negative    POC Leukocyte Esterase Trace (A) Negative   Influenza A/B By PCR (Adult - Flu Only)    Collection Time: 11/13/19  7:45 PM   Result Value Ref Range    Influenza virus A RNA Negative Negative    Influenza virus B, PCR Negative Negative       Assessment:  1  38w0d  2. . Doing well  3. Size equals Dates and/or Scan  4. Weight gain: normal: Yes, excessive:No  5. Patient not really walking : discuss walking and early labor                       Plan.  1. Rediscuss diet.  2. Increase water intake PRN  3. Continue vitamins.  4. Kick counts as instructed.  5. Discuss support hose and proper shoe wear as indicated.

## 2019-11-22 ENCOUNTER — ROUTINE PRENATAL (OUTPATIENT)
Dept: OBGYN | Facility: CLINIC | Age: 24
End: 2019-11-22

## 2019-11-22 VITALS — DIASTOLIC BLOOD PRESSURE: 70 MMHG | SYSTOLIC BLOOD PRESSURE: 110 MMHG | BODY MASS INDEX: 34.02 KG/M2 | WEIGHT: 186 LBS

## 2019-11-22 DIAGNOSIS — Z34.83 ENCOUNTER FOR SUPERVISION OF OTHER NORMAL PREGNANCY, THIRD TRIMESTER: Primary | ICD-10-CM

## 2019-11-22 PROCEDURE — 90040 PR PRENATAL FOLLOW UP: CPT | Performed by: NURSE PRACTITIONER

## 2019-11-22 NOTE — PROGRESS NOTES
Pt here today for OB follow up  Pt states some pressure  Reports +FM  Good # [phone]  Pharmacy Confirmed.  Chaperone offered and provided.

## 2019-11-22 NOTE — PROGRESS NOTES
SUBJECTIVE:  Pt is a 24 y.o.   at 39w0d  gestation. Presents today for follow-up prenatal care. Reports no issues at this time.  Reports feeling good  fetal movement. Denies cramping/contractions, bleeding or leaking of fluid. Denies dysuria, headaches, N/V. Generally feels well today. Recent ER or L&D visits - none.     OBJECTIVE:  - See prenatal vitals flow  -   Vitals:    19 1427   BP: 110/70   Weight: 84.4 kg (186 lb)      Fundal Height - 39  FHT - 140  US normal  Prenatal labs normal              ASSESSMENT:   - IUP at 39w0d    - S=D   -   Patient Active Problem List    Diagnosis Date Noted   • Syphilis in pregnancy, antepartum, third trimester 10/01/2019   • Supervision of other normal pregnancy 2019         PLAN:  - S/sx pregnancy and labor warning signs vs general discomforts discussed  - Fetal movements and kick counts reviewed   - Adequate hydration reinforced  - Nutrition/exercise/vitamin education; continued PNV  - induction of labor order placed in absence of spontaneous labor.

## 2019-11-25 ENCOUNTER — HOSPITAL ENCOUNTER (OUTPATIENT)
Facility: MEDICAL CENTER | Age: 24
End: 2019-11-25
Attending: OBSTETRICS & GYNECOLOGY | Admitting: OBSTETRICS & GYNECOLOGY
Payer: MEDICAID

## 2019-11-25 VITALS
HEIGHT: 62 IN | WEIGHT: 186 LBS | BODY MASS INDEX: 34.23 KG/M2 | TEMPERATURE: 97.3 F | RESPIRATION RATE: 17 BRPM | SYSTOLIC BLOOD PRESSURE: 119 MMHG | HEART RATE: 97 BPM | DIASTOLIC BLOOD PRESSURE: 82 MMHG

## 2019-11-25 PROCEDURE — 59025 FETAL NON-STRESS TEST: CPT

## 2019-11-25 ASSESSMENT — PAIN SCALES - GENERAL: PAINLEVEL: 5 - MODERATE PAIN

## 2019-11-25 NOTE — PROGRESS NOTES
Report received from NOC RN, POC discussed, assumed pt care. SVE after walking no change. Report to Dr. Hawkins, discharge order received.   Discharge teaching performed. Pt and FOB verbalized understanding. Discharged to home.

## 2019-11-25 NOTE — PROGRESS NOTES
0504- Pt presents to L&D with c/o ctxs. Denies any LOF or vaginal bleeding. +FM. Denies any complications with this pregnancy. EFM applied. Vitals WNL. SVE checked.     0530- RN called resident with report. Orders received to have pt ambulate for 1 hour and recheck SVE. Pt up to ambulate in [place]ott.     0643- Pt returned to triage. EFM applied.     0651- SVE rechecked. Dilation unchanged.     0700- SBAR to April RN.

## 2019-11-26 ENCOUNTER — ANESTHESIA EVENT (OUTPATIENT)
Dept: ANESTHESIOLOGY | Facility: MEDICAL CENTER | Age: 24
End: 2019-11-26
Payer: MEDICAID

## 2019-11-26 ENCOUNTER — HOSPITAL ENCOUNTER (INPATIENT)
Facility: MEDICAL CENTER | Age: 24
LOS: 2 days | End: 2019-11-28
Attending: OBSTETRICS & GYNECOLOGY | Admitting: OBSTETRICS & GYNECOLOGY
Payer: MEDICAID

## 2019-11-26 ENCOUNTER — ANESTHESIA (OUTPATIENT)
Dept: ANESTHESIOLOGY | Facility: MEDICAL CENTER | Age: 24
End: 2019-11-26
Payer: MEDICAID

## 2019-11-26 LAB
BASOPHILS # BLD AUTO: 0.3 % (ref 0–1.8)
BASOPHILS # BLD: 0.04 K/UL (ref 0–0.12)
EOSINOPHIL # BLD AUTO: 0.05 K/UL (ref 0–0.51)
EOSINOPHIL NFR BLD: 0.4 % (ref 0–6.9)
ERYTHROCYTE [DISTWIDTH] IN BLOOD BY AUTOMATED COUNT: 43.5 FL (ref 35.9–50)
ERYTHROCYTE [DISTWIDTH] IN BLOOD BY AUTOMATED COUNT: 43.5 FL (ref 35.9–50)
HCT VFR BLD AUTO: 35.8 % (ref 37–47)
HCT VFR BLD AUTO: 42.4 % (ref 37–47)
HGB BLD-MCNC: 11.9 G/DL (ref 12–16)
HGB BLD-MCNC: 14.4 G/DL (ref 12–16)
HOLDING TUBE BB 8507: NORMAL
IMM GRANULOCYTES # BLD AUTO: 0.06 K/UL (ref 0–0.11)
IMM GRANULOCYTES NFR BLD AUTO: 0.5 % (ref 0–0.9)
LYMPHOCYTES # BLD AUTO: 2.09 K/UL (ref 1–4.8)
LYMPHOCYTES NFR BLD: 16.2 % (ref 22–41)
MCH RBC QN AUTO: 31.9 PG (ref 27–33)
MCH RBC QN AUTO: 32.3 PG (ref 27–33)
MCHC RBC AUTO-ENTMCNC: 33.2 G/DL (ref 33.6–35)
MCHC RBC AUTO-ENTMCNC: 34 G/DL (ref 33.6–35)
MCV RBC AUTO: 95.1 FL (ref 81.4–97.8)
MCV RBC AUTO: 96 FL (ref 81.4–97.8)
MONOCYTES # BLD AUTO: 0.84 K/UL (ref 0–0.85)
MONOCYTES NFR BLD AUTO: 6.5 % (ref 0–13.4)
NEUTROPHILS # BLD AUTO: 9.81 K/UL (ref 2–7.15)
NEUTROPHILS NFR BLD: 76.1 % (ref 44–72)
NRBC # BLD AUTO: 0 K/UL
NRBC BLD-RTO: 0 /100 WBC
PLATELET # BLD AUTO: 191 K/UL (ref 164–446)
PLATELET # BLD AUTO: 193 K/UL (ref 164–446)
PMV BLD AUTO: 10.6 FL (ref 9–12.9)
PMV BLD AUTO: 10.7 FL (ref 9–12.9)
RBC # BLD AUTO: 3.73 M/UL (ref 4.2–5.4)
RBC # BLD AUTO: 4.46 M/UL (ref 4.2–5.4)
WBC # BLD AUTO: 12.9 K/UL (ref 4.8–10.8)
WBC # BLD AUTO: 15.8 K/UL (ref 4.8–10.8)

## 2019-11-26 PROCEDURE — A9270 NON-COVERED ITEM OR SERVICE: HCPCS | Performed by: NURSE PRACTITIONER

## 2019-11-26 PROCEDURE — 700111 HCHG RX REV CODE 636 W/ 250 OVERRIDE (IP): Performed by: STUDENT IN AN ORGANIZED HEALTH CARE EDUCATION/TRAINING PROGRAM

## 2019-11-26 PROCEDURE — 700105 HCHG RX REV CODE 258: Performed by: STUDENT IN AN ORGANIZED HEALTH CARE EDUCATION/TRAINING PROGRAM

## 2019-11-26 PROCEDURE — 700105 HCHG RX REV CODE 258

## 2019-11-26 PROCEDURE — 36415 COLL VENOUS BLD VENIPUNCTURE: CPT

## 2019-11-26 PROCEDURE — 700112 HCHG RX REV CODE 229: Performed by: NURSE PRACTITIONER

## 2019-11-26 PROCEDURE — 304965 HCHG RECOVERY SERVICES

## 2019-11-26 PROCEDURE — 59409 OBSTETRICAL CARE: CPT | Performed by: NURSE PRACTITIONER

## 2019-11-26 PROCEDURE — 700111 HCHG RX REV CODE 636 W/ 250 OVERRIDE (IP): Performed by: ANESTHESIOLOGY

## 2019-11-26 PROCEDURE — 770002 HCHG ROOM/CARE - OB PRIVATE (112)

## 2019-11-26 PROCEDURE — 700102 HCHG RX REV CODE 250 W/ 637 OVERRIDE(OP): Performed by: NURSE PRACTITIONER

## 2019-11-26 PROCEDURE — 700111 HCHG RX REV CODE 636 W/ 250 OVERRIDE (IP)

## 2019-11-26 PROCEDURE — 85025 COMPLETE CBC W/AUTO DIFF WBC: CPT

## 2019-11-26 PROCEDURE — 303615 HCHG EPIDURAL/SPINAL ANESTHESIA FOR LABOR

## 2019-11-26 PROCEDURE — 59409 OBSTETRICAL CARE: CPT

## 2019-11-26 PROCEDURE — 85027 COMPLETE CBC AUTOMATED: CPT

## 2019-11-26 PROCEDURE — 10907ZC DRAINAGE OF AMNIOTIC FLUID, THERAPEUTIC FROM PRODUCTS OF CONCEPTION, VIA NATURAL OR ARTIFICIAL OPENING: ICD-10-PCS | Performed by: OBSTETRICS & GYNECOLOGY

## 2019-11-26 RX ORDER — IBUPROFEN 600 MG/1
600 TABLET ORAL EVERY 6 HOURS PRN
Status: DISCONTINUED | OUTPATIENT
Start: 2019-11-26 | End: 2019-11-26

## 2019-11-26 RX ORDER — ACETAMINOPHEN 325 MG/1
325 TABLET ORAL EVERY 4 HOURS PRN
Status: DISCONTINUED | OUTPATIENT
Start: 2019-11-26 | End: 2019-11-26

## 2019-11-26 RX ORDER — ACETAMINOPHEN 325 MG/1
650 TABLET ORAL EVERY 4 HOURS PRN
Status: DISCONTINUED | OUTPATIENT
Start: 2019-11-26 | End: 2019-11-26

## 2019-11-26 RX ORDER — ACETAMINOPHEN 325 MG/1
650 TABLET ORAL EVERY 4 HOURS PRN
Status: DISCONTINUED | OUTPATIENT
Start: 2019-11-26 | End: 2019-11-28 | Stop reason: HOSPADM

## 2019-11-26 RX ORDER — DOCUSATE SODIUM 100 MG/1
100 CAPSULE, LIQUID FILLED ORAL 2 TIMES DAILY PRN
Status: DISCONTINUED | OUTPATIENT
Start: 2019-11-26 | End: 2019-11-28 | Stop reason: HOSPADM

## 2019-11-26 RX ORDER — IBUPROFEN 600 MG/1
600 TABLET ORAL EVERY 6 HOURS PRN
Status: DISCONTINUED | OUTPATIENT
Start: 2019-11-26 | End: 2019-11-28 | Stop reason: HOSPADM

## 2019-11-26 RX ORDER — ROPIVACAINE HYDROCHLORIDE 2 MG/ML
INJECTION, SOLUTION EPIDURAL; INFILTRATION; PERINEURAL CONTINUOUS
Status: DISCONTINUED | OUTPATIENT
Start: 2019-11-26 | End: 2019-11-28 | Stop reason: HOSPADM

## 2019-11-26 RX ORDER — ALUMINA, MAGNESIA, AND SIMETHICONE 2400; 2400; 240 MG/30ML; MG/30ML; MG/30ML
30 SUSPENSION ORAL EVERY 6 HOURS PRN
Status: DISCONTINUED | OUTPATIENT
Start: 2019-11-26 | End: 2019-11-26 | Stop reason: HOSPADM

## 2019-11-26 RX ORDER — ONDANSETRON 2 MG/ML
4 INJECTION INTRAMUSCULAR; INTRAVENOUS EVERY 6 HOURS PRN
Status: DISCONTINUED | OUTPATIENT
Start: 2019-11-26 | End: 2019-11-28 | Stop reason: HOSPADM

## 2019-11-26 RX ORDER — SODIUM CHLORIDE, SODIUM LACTATE, POTASSIUM CHLORIDE, AND CALCIUM CHLORIDE .6; .31; .03; .02 G/100ML; G/100ML; G/100ML; G/100ML
1000 INJECTION, SOLUTION INTRAVENOUS
Status: COMPLETED | OUTPATIENT
Start: 2019-11-26 | End: 2019-11-26

## 2019-11-26 RX ORDER — ONDANSETRON 4 MG/1
4 TABLET, ORALLY DISINTEGRATING ORAL EVERY 6 HOURS PRN
Status: DISCONTINUED | OUTPATIENT
Start: 2019-11-26 | End: 2019-11-28 | Stop reason: HOSPADM

## 2019-11-26 RX ORDER — VITAMIN A ACETATE, BETA CAROTENE, ASCORBIC ACID, CHOLECALCIFEROL, .ALPHA.-TOCOPHEROL ACETATE, DL-, THIAMINE MONONITRATE, RIBOFLAVIN, NIACINAMIDE, PYRIDOXINE HYDROCHLORIDE, FOLIC ACID, CYANOCOBALAMIN, CALCIUM CARBONATE, FERROUS FUMARATE, ZINC OXIDE, CUPRIC OXIDE 3080; 12; 120; 400; 1; 1.84; 3; 20; 22; 920; 25; 200; 27; 10; 2 [IU]/1; UG/1; MG/1; [IU]/1; MG/1; MG/1; MG/1; MG/1; MG/1; [IU]/1; MG/1; MG/1; MG/1; MG/1; MG/1
1 TABLET, FILM COATED ORAL EVERY MORNING
Status: DISCONTINUED | OUTPATIENT
Start: 2019-11-26 | End: 2019-11-28 | Stop reason: HOSPADM

## 2019-11-26 RX ORDER — SODIUM CHLORIDE, SODIUM LACTATE, POTASSIUM CHLORIDE, AND CALCIUM CHLORIDE .6; .31; .03; .02 G/100ML; G/100ML; G/100ML; G/100ML
250 INJECTION, SOLUTION INTRAVENOUS PRN
Status: DISCONTINUED | OUTPATIENT
Start: 2019-11-26 | End: 2019-11-26 | Stop reason: HOSPADM

## 2019-11-26 RX ORDER — SODIUM CHLORIDE, SODIUM LACTATE, POTASSIUM CHLORIDE, CALCIUM CHLORIDE 600; 310; 30; 20 MG/100ML; MG/100ML; MG/100ML; MG/100ML
INJECTION, SOLUTION INTRAVENOUS CONTINUOUS
Status: DISPENSED | OUTPATIENT
Start: 2019-11-26 | End: 2019-11-26

## 2019-11-26 RX ORDER — SODIUM CHLORIDE, SODIUM LACTATE, POTASSIUM CHLORIDE, CALCIUM CHLORIDE 600; 310; 30; 20 MG/100ML; MG/100ML; MG/100ML; MG/100ML
INJECTION, SOLUTION INTRAVENOUS
Status: COMPLETED
Start: 2019-11-26 | End: 2019-11-26

## 2019-11-26 RX ORDER — SODIUM CHLORIDE, SODIUM LACTATE, POTASSIUM CHLORIDE, CALCIUM CHLORIDE 600; 310; 30; 20 MG/100ML; MG/100ML; MG/100ML; MG/100ML
INJECTION, SOLUTION INTRAVENOUS PRN
Status: DISCONTINUED | OUTPATIENT
Start: 2019-11-26 | End: 2019-11-28 | Stop reason: HOSPADM

## 2019-11-26 RX ORDER — ROPIVACAINE HYDROCHLORIDE 2 MG/ML
INJECTION, SOLUTION EPIDURAL; INFILTRATION; PERINEURAL
Status: COMPLETED
Start: 2019-11-26 | End: 2019-11-26

## 2019-11-26 RX ADMIN — IBUPROFEN 600 MG: 600 TABLET ORAL at 21:40

## 2019-11-26 RX ADMIN — VITAMIN A, VITAMIN C, VITAMIN D-3, VITAMIN E, VITAMIN B-1, VITAMIN B-2, NIACIN, VITAMIN B-6, CALCIUM, IRON, ZINC, COPPER 1 TABLET: 4000; 120; 400; 22; 1.84; 3; 20; 10; 1; 12; 200; 27; 25; 2 TABLET ORAL at 14:13

## 2019-11-26 RX ADMIN — SODIUM CHLORIDE, POTASSIUM CHLORIDE, SODIUM LACTATE AND CALCIUM CHLORIDE: 600; 310; 30; 20 INJECTION, SOLUTION INTRAVENOUS at 02:45

## 2019-11-26 RX ADMIN — DOCUSATE SODIUM 100 MG: 100 CAPSULE, LIQUID FILLED ORAL at 21:40

## 2019-11-26 RX ADMIN — SODIUM CHLORIDE, POTASSIUM CHLORIDE, SODIUM LACTATE AND CALCIUM CHLORIDE: 600; 310; 30; 20 INJECTION, SOLUTION INTRAVENOUS at 05:18

## 2019-11-26 RX ADMIN — SODIUM CHLORIDE, POTASSIUM CHLORIDE, SODIUM LACTATE AND CALCIUM CHLORIDE 1000 ML: 600; 310; 30; 20 INJECTION, SOLUTION INTRAVENOUS at 03:43

## 2019-11-26 RX ADMIN — Medication 2000 ML/HR: at 09:28

## 2019-11-26 RX ADMIN — ROPIVACAINE HYDROCHLORIDE 200 MG: 2 INJECTION, SOLUTION EPIDURAL; INFILTRATION; PERINEURAL at 03:30

## 2019-11-26 RX ADMIN — SODIUM CHLORIDE, SODIUM LACTATE, POTASSIUM CHLORIDE, AND CALCIUM CHLORIDE 1000 ML: .6; .31; .03; .02 INJECTION, SOLUTION INTRAVENOUS at 03:43

## 2019-11-26 RX ADMIN — FENTANYL CITRATE 25 MCG: 50 INJECTION, SOLUTION INTRAMUSCULAR; INTRAVENOUS at 03:25

## 2019-11-26 RX ADMIN — ROPIVACAINE HYDROCHLORIDE 200 MG: 2 INJECTION, SOLUTION EPIDURAL; INFILTRATION at 03:30

## 2019-11-26 RX ADMIN — Medication 125 ML/HR: at 10:15

## 2019-11-26 RX ADMIN — IBUPROFEN 600 MG: 600 TABLET ORAL at 14:13

## 2019-11-26 ASSESSMENT — LIFESTYLE VARIABLES
TOTAL SCORE: 0
HOW MANY TIMES IN THE PAST YEAR HAVE YOU HAD 5 OR MORE DRINKS IN A DAY: 0
CONSUMPTION TOTAL: NEGATIVE
HAVE YOU EVER FELT YOU SHOULD CUT DOWN ON YOUR DRINKING: NO
ALCOHOL_USE: NO
EVER_SMOKED: NEVER
EVER FELT BAD OR GUILTY ABOUT YOUR DRINKING: NO
EVER HAD A DRINK FIRST THING IN THE MORNING TO STEADY YOUR NERVES TO GET RID OF A HANGOVER: NO
TOTAL SCORE: 0
TOTAL SCORE: 0
HAVE PEOPLE ANNOYED YOU BY CRITICIZING YOUR DRINKING: NO
ON A TYPICAL DAY WHEN YOU DRINK ALCOHOL HOW MANY DRINKS DO YOU HAVE: 0
AVERAGE NUMBER OF DAYS PER WEEK YOU HAVE A DRINK CONTAINING ALCOHOL: 0

## 2019-11-26 ASSESSMENT — PAIN SCALES - GENERAL: PAIN_LEVEL: 0

## 2019-11-26 ASSESSMENT — PATIENT HEALTH QUESTIONNAIRE - PHQ9
SUM OF ALL RESPONSES TO PHQ9 QUESTIONS 1 AND 2: 0
2. FEELING DOWN, DEPRESSED, IRRITABLE, OR HOPELESS: NOT AT ALL
1. LITTLE INTEREST OR PLEASURE IN DOING THINGS: NOT AT ALL

## 2019-11-26 NOTE — PROGRESS NOTES
0700- Report received. Care assumed.  at 39-4 admitted this morning in active labor. Comfortable with epidural. 8-9/100/0 on recent exam. POC discussed. Pt denies needs. Family at bedside.   0740- Fluids encouraged for suspected dehydration (tachycardia and machelle urine). Pt denies pressure or concerns.   0848- Pushing started. CNM notified and tachycardia discussed. Pt denies CP or SOB or other concerns. No new orders.  0922-  of vigorous female infant with 8/9 apgars.   1030- Bonding with baby. Baby crying, latch attempted but unsuccessful. Breakfast ordered.  1200- Baby to NBN for desat x 3 when not crying.   1310- Up to bathroom, steady, + void. Archana care done. To NBN to see baby and then to PP. Report given.

## 2019-11-26 NOTE — H&P
[facility] LABOR AND DELIVERY HISTORY AND PHYSICAL    PATIENT ID:  NAME:  Gypsy Hitchcock  MRN:               [id number]  YOB: 1995    CC:  Pregnancy     HPI:  Gypsy Hitchcock is a 24 y.o. female  at 39w4d by a 20 week ultrasound performed on 19. Estimated Date of Delivery: 19  Patient presents complaining of + uterine contractions with some pink colored discharge. + fetal movement.  Mild vaginal bleeding, but no loss of clear fluid. Patient denies any history of GDM and HTN.     ROS: Patient denies any fever chills, nausea, vomiting, headache, chest pain, shortness of breath    Prenatal Care: Obtained at [facility], 1st visit 7/10/19.      Prenatal Labs:   HepBsAg: neg HIV: non-reactive Rubella: immune   RPR: non-reactive PAP: normal GBS: negative   GC/CT: neg O/ Ab + Quad Screen: n/a     Recent Labs     19  0235   WBC 12.9*   RBC 4.46   HEMOGLOBIN 14.4   HEMATOCRIT 42.4   MCV 95.1   MCH 32.3   RDW 43.5   PLATELETCT 193   MPV 10.6   NEUTSPOLYS 76.10*   LYMPHOCYTES 16.20*   MONOCYTES 6.50   EOSINOPHILS 0.40   BASOPHILS 0.30     No results for input(s): SODIUM, POTASSIUM, CHLORIDE, CO2, GLUCOSE, BUN, CPKTOTAL in the last 72 hours.       IMAGING:  OB ultrasound: 19 IMPRESSION:     Single intrauterine pregnancy of an estimated gestational age of 30 weeks 2 days with an estimated date of delivery of 2019.    POB Hx:  OB History    Para Term  AB Living   2 1 1     1   SAB TAB Ectopic Molar Multiple Live Births             1      # Outcome Date GA Lbr Andre/2nd Weight Sex Delivery Anes PTL Lv   2 Current            1 Term 11 39w5d  3.558 kg (7 lb 13.5 oz) F Vag-Spont OTHER N ESTELLA      Birth Comments:  cervical lac repaired 1 stitch, 3rd degree perineal lac repaired.       PMH/Problem List:    History reviewed. No pertinent past medical history.  Patient Active Problem List    Diagnosis Date Noted        • Supervision of other normal pregnancy 2019        Current Outpatient Medications:  No current facility-administered medications on file prior to encounter.      Current Outpatient Medications on File Prior to Encounter   Medication Sig Dispense Refill   • ondansetron (ZOFRAN ODT) 4 MG TABLET DISPERSIBLE Take 1 Tab by mouth every 6 hours as needed for Nausea. 20 Tab 0   • Prenatal Vit-Fe Sulfate-FA (PRENATAL VITAMIN PO) Take  by mouth.         PSH:    None    Allergies:   No Known Allergies    SH:  No alcohol   No tobacco  No recreational drugs     PHYSICAL EXAM:  Vitals:    19 0555 19 0558 19 0603 19 0608   BP: 106/64      Pulse: (!) 109 (!) 119 (!) 132 (!) 110   Resp:       Temp:       TempSrc:       SpO2:  99% 97% 97%   Weight:       Height:         Temp (24hrs), Av.3 °C (97.3 °F), Min:36.2 °C (97.2 °F), Max:36.3 °C (97.3 °F)    General: No acute distress, resting comfortably in bed.  HEENT: normocephalic, nontraumatic, EOMI  Cardiovascular: Heart RRR   Respiratory: symmetric chest expansion  Abdomen: gravid, nontender  Musculoskeletal: strength 5/5 in four extremities  Neuro: non focal    SVE: 7-8/90%/-1  [place]: Q3 minutes; EFM: 150 with accels to 170    A/P: Intrauterine pregancy at 39w4d weeks in active labor here for expectant vaginal delivery   1. IUP at term  2. Patient is GBS negative, no need for abx   3. Problem list shows history of syphilis; however, non-reactive on 10/22/19. No known positive tests recorded   4. Anticipating vaginal delivery     OB Attending Addendum:    I have seen and examined Ms. Gypsy Hitchcock and agree w/ documentation by Dr. Davison.  Pt is a 24 y.o.yo  @ 39w4d admitted in labor.  Fetal heart tracing reassuring.      Paula Moraes MD  [facility], Women's Health

## 2019-11-26 NOTE — ANESTHESIA PROCEDURE NOTES
CSE Block  Date/Time: 11/26/2019 3:25 AM  Performed by: Paula Fontanez M.D.  Authorized by: Paula Fontanez M.D.     Patient Location:  OB  Start Time:  11/26/2019 3:25 AM  End Time:  11/26/2019 3:30 AM  Reason for Block: primary anesthetic and labor analgesia    patient identified, IV checked, site marked, risks and benefits discussed, surgical consent, monitors and equipment checked, pre-op evaluation and timeout performed    Patient Position:  Sitting  Prep: ChloraPrep, patient draped and sterile technique    Monitoring:  Blood pressure, continuous pulse oximetry and heart rate  Approach:  Midline  Needle Type:  Pencan  Needle Gauge:  25 G  Injection Technique:  OSORIO air  Needle Type:  Tuohy  Needle Gauge:  17 G  Needle Length:  3.5  Loss of resistance:  5  Location:  L3-L4  Catheter Size:  19 G  Catheter at Skin Depth:  10  Test Dose Result: no test dose.  Sensory Level:  T10

## 2019-11-26 NOTE — ANESTHESIA PREPROCEDURE EVALUATION
25 yo  @ 39w4d presenting in labor, requesting epidural    Relevant Problems   No relevant active problems       Physical Exam    Airway   Mallampati: II  TM distance: >3 FB  Neck ROM: full       Cardiovascular - normal exam  Rhythm: regular  Rate: normal  (-) murmur     Dental - normal exam         Pulmonary - normal exam  Breath sounds clear to auscultation     Abdominal    Neurological - normal exam                 Anesthesia Plan    ASA 2       Plan - CSE                   Pertinent diagnostic labs and testing reviewed    Informed Consent:    Anesthetic plan and risks discussed with patient.    Use of blood products discussed with: patient whom consented to blood products.

## 2019-11-26 NOTE — ANESTHESIA TIME REPORT
Anesthesia Start and Stop Event Times     Date Time Event    11/26/2019 0301 Ready for Procedure     0325 Anesthesia Start     0922 Anesthesia Stop        Responsible Staff  11/26/19    Name Role Begin End    Paula Fontanez M.D. Anesth 0325 0711    Perez Martines M.D. Anesth 0711 0922        Preop Diagnosis (Free Text):  Pre-op Diagnosis             Preop Diagnosis (Codes):    Post op Diagnosis  Pain during labor  Term IUP at 39 weeks gestation, irene    Premium Reason  A. 3PM - 7AM    Comments:

## 2019-11-26 NOTE — ANESTHESIA QCDR
2019 [facility] Clinical Data Registry (for Quality Improvement)     Postoperative nausea/vomiting risk protocol (Adult = 18 yrs and Pediatric 3-17 yrs)- (430 and 463)  General inhalation anesthetic (NOT TIVA) with PONV risk factors: No  Provision of anti-emetic therapy with at least 2 different classes of agents: N/A  Patient DID NOT receive anti-emetic therapy and reason is documented in Medical Record: N/A    Multimodal Pain Management- (AQI59)  Patient undergoing Elective Surgery (i.e. Outpatient, or ASC, or Prescheduled Surgery prior to Hospital Admission): No  Use of Multimodal Pain Management, two or more drugs and/or interventions, NOT including systemic opioids: N/A  Exception: Documented allergy to multiple classes of analgesics: N/A    PACU assessment of acute postoperative pain prior to Anesthesia Care End- Applies to Patients Age = 18- (ABG7)  Initial PACU pain score is which of the following: < 7/10  Patient unable to report pain score: N/A    Post-anesthetic transfer of care checklist/protocol to PACU/ICU- (426 and 427)  Upon conclusion of case, patient transferred to which of the following locations: PACU/Non-ICU  Use of transfer checklist/protocol: Yes  Exclusion: Service Performed in Patient Hospital Room (and thus did not require transfer): N/A    PACU Reintubation- (AQI31)  General anesthesia requiring endotracheal intubation (ETT) along with subsequent extubation in OR or PACU: No  Required reintubation in the PACU: N/A  Extubation was a planned trial documented in the medical record prior to removal of the original airway device: N/A    Unplanned admission to ICU related to anesthesia service up through end of PACU care- (MD51)  Unplanned admission to ICU (not initially anticipated at anesthesia start time): No

## 2019-11-26 NOTE — ANESTHESIA POSTPROCEDURE EVALUATION
Patient: Gypsy Hitchcock    Procedure Summary     Date:  11/26/19 Room / Location:      Anesthesia Start:  0325 Anesthesia Stop:  0922    Procedure:  Labor Epidural Diagnosis:      Scheduled Providers:   Responsible Provider:  Perez Martines M.D.    Anesthesia Type:  CSE ASA Status:  2          Final Anesthesia Type: CSE  Last vitals  BP   Blood Pressure: 103/54    Temp   37.3 °C (99.1 °F)    Pulse   Pulse: (!) 105   Resp   17    SpO2   99 %      Anesthesia Post Evaluation    Patient location during evaluation: floor  Patient participation: complete - patient participated  Level of consciousness: awake and alert  Pain score: 0    Airway patency: patent  Anesthetic complications: no  Cardiovascular status: hemodynamically stable  Respiratory status: acceptable  Hydration status: euvolemic    PONV: none

## 2019-11-26 NOTE — PROGRESS NOTES
EDC 2019     0230 - Assumed care of pt. IV started, labs drawn.    0330 - Dr. Fontanez at bedside for epidural placement.    0445 - Pt reports feeling difficulty breathing, BP 82/53. Pt turned on left side, HOB elevated, epidural turned off, LR bolus began, and O2 applied.     0451 - BP 97/65, pt reports feeling better, VSS.    0540 - Dr. Fontanez updated on epidural status, order received to start epidural at 7 mls/hr    0530 - Dr. Parker updated on labor status, strip reviewed.    0557 - Dr. Parker and Dr. Davison at bedside.     0558 - AROM, clear fluid, SVE 8-9/100/0    0700 - Report given to JAKE Bass RN, POC discussed, mention of syphilis noted in previous encounter, 10/22 labs show neg for syphilis. Discussed with JKAE Bass RN.

## 2019-11-26 NOTE — PROGRESS NOTES
Patient arrived to S[facility] with SO and belongings. Discussed POC and oriented patient to room, call light, emergency cords, and unit policies.

## 2019-11-26 NOTE — PROGRESS NOTES
.  PROGRESS NOTE L&D    S: 24 y.o.  at 39w4d here for expectant vaginal delivery.    O:   Vitals:    19 0456   BP: (!) 99/58   Pulse: (!) 122   Resp:    Temp:    SpO2:      FHR: 150, pos accelerations, moderate variability, cat II fetal heart tracing  Carney: q2-3 minutes  SVE: 8-9/100%/0      A/P:   - IUP at term  - Continue management of expectant vaginal delivery   - Pitocin per protocol   - Artificial ROM at 6:01 AM, clear fluid      Syed Davison  [facility]  PGY-1

## 2019-11-26 NOTE — L&D DELIVERY NOTE
OB Vaginal Delivery Note    2019  Gypsy Hitchcock  24 y.o.    Patient was admitted to Labor and Delivery at 39w4d for active labor    Review the Delivery Report for details.     Gestational Age: 39w4d  /Para:   Labor Complications:   Maternal pulse elevated to 120s during second stage of labor.  Was 90's-100 upon admission  Blood Loss:   IO Blood Loss  19 2122 - 19 0942    None          Delivery Type:     ROM to Delivery Time: 3h 23m   Sex: Female   Weight:   pending   1 Minute 5 Minute 10 Minute   Apgar Totals: 8    9            Delivery Details:  Gypsy Hitchcock, a 24 y.o.  female delivered a viable Female infant at 0922 with Apgars of 8   and 9  . Patient progressed to  fully dilated and started to push.  The patient was put in the dorsal lithotomy position. Delivery was via   to a sterile field under   epidural anesthesia. Infant delivered in direct OA position. Anterior and posterior shoulders delivered without difficulty and with maternal expulsive efforts.  The baby was placed on the maternal abdomen and dried and stimulated by the RN.    The cord was double clamped after about 3 minutes,  cut by the FOB and 3 vessels were noted. No cord complications. Cord blood was not obtained.  Intact  placenta delivered at 2019  9:28 AM . 20 units pitocin in 1000ml LR was initiated rapid IV. Placental disposition: discarded . Fundal massage performed and fundus found to be firm. Perineum, vagina, cervix were inspected, and no lacerations were noted.    Infant and patient in delivery room in good and stable condition.   GUANAKITO Almaraz.   Dr Santoro attending.

## 2019-11-26 NOTE — CARE PLAN
Problem: Safety  Goal: Free from accidental injury  Outcome: PROGRESSING AS EXPECTED     Problem: Knowledge Deficit  Goal: Patient/Support person demonstrates understanding regarding the progression of labor, available options and participates in decision-making process  Outcome: PROGRESSING AS EXPECTED

## 2019-11-27 PROBLEM — Z34.80 SUPERVISION OF OTHER NORMAL PREGNANCY: Status: RESOLVED | Noted: 2019-09-17 | Resolved: 2019-11-27

## 2019-11-27 PROCEDURE — 770002 HCHG ROOM/CARE - OB PRIVATE (112)

## 2019-11-27 PROCEDURE — 700102 HCHG RX REV CODE 250 W/ 637 OVERRIDE(OP): Performed by: NURSE PRACTITIONER

## 2019-11-27 PROCEDURE — 700112 HCHG RX REV CODE 229: Performed by: NURSE PRACTITIONER

## 2019-11-27 PROCEDURE — A9270 NON-COVERED ITEM OR SERVICE: HCPCS | Performed by: NURSE PRACTITIONER

## 2019-11-27 RX ORDER — IBUPROFEN 600 MG/1
600 TABLET ORAL EVERY 6 HOURS PRN
Qty: 30 TAB | Refills: 2 | Status: ON HOLD | OUTPATIENT
Start: 2019-11-27 | End: 2022-10-19

## 2019-11-27 RX ADMIN — IBUPROFEN 600 MG: 600 TABLET ORAL at 16:54

## 2019-11-27 RX ADMIN — DOCUSATE SODIUM 100 MG: 100 CAPSULE, LIQUID FILLED ORAL at 09:29

## 2019-11-27 RX ADMIN — VITAMIN A, VITAMIN C, VITAMIN D-3, VITAMIN E, VITAMIN B-1, VITAMIN B-2, NIACIN, VITAMIN B-6, CALCIUM, IRON, ZINC, COPPER 1 TABLET: 4000; 120; 400; 22; 1.84; 3; 20; 10; 1; 12; 200; 27; 25; 2 TABLET ORAL at 06:10

## 2019-11-27 RX ADMIN — IBUPROFEN 600 MG: 600 TABLET ORAL at 09:29

## 2019-11-27 ASSESSMENT — EDINBURGH POSTNATAL DEPRESSION SCALE (EPDS)
I HAVE BEEN ABLE TO LAUGH AND SEE THE FUNNY SIDE OF THINGS: AS MUCH AS I ALWAYS COULD
I HAVE BEEN ANXIOUS OR WORRIED FOR NO GOOD REASON: HARDLY EVER
I HAVE BEEN SO UNHAPPY THAT I HAVE BEEN CRYING: NO, NEVER
I HAVE BEEN SO UNHAPPY THAT I HAVE HAD DIFFICULTY SLEEPING: NOT AT ALL
THINGS HAVE BEEN GETTING ON TOP OF ME: NO, MOST OF THE TIME I HAVE COPED QUITE WELL
I HAVE BLAMED MYSELF UNNECESSARILY WHEN THINGS WENT WRONG: NOT VERY OFTEN
I HAVE LOOKED FORWARD WITH ENJOYMENT TO THINGS: RATHER LESS THAN I USED TO
THE THOUGHT OF HARMING MYSELF HAS OCCURRED TO ME: NEVER
I HAVE FELT SAD OR MISERABLE: NO, NOT AT ALL
I HAVE FELT SCARED OR PANICKY FOR NO GOOD REASON: NO, NOT AT ALL

## 2019-11-27 NOTE — PROGRESS NOTES
Instructed patient on the use of the electric breast pump. Patient verbalizes understanding. Encouraged patient to call if she needs help.

## 2019-11-27 NOTE — LACTATION NOTE
"Initial visit. Baby was in NB until 0330 this morning. Baby  in nursery and MOB reports she has also given bottles to baby. Baby awake and rooting. MOB taught to unswaddle baby for feedings. Baby attempts at left breast in cross cradle hold, frequent latching and unlatching. Taught MOB how to suck train to keep baby in sucking pattern and transferred to breast and loses latch again. MOB assisted with football hold on right, due to breast anatomy and weight, and baby latches and sustains suckling slightly longer. MOB taught how to 'sandwich\" the breast for a deeper latch. Frequent attempts by baby with one spurt of 1-2 minutes suckling. MOB will continue to work with baby on latch, given [place] bfdg anette.   "

## 2019-11-27 NOTE — DISCHARGE SUMMARY
Discharge Summary:      Gypsy Hitchcock    Admit Date:   2019  Discharge Date:  2019     Admitting diagnosis:  Pregnancy  Labor and delivery, indication for care  Discharge Diagnosis: Status post vaginal, spontaneous.  Pregnancy Complications: none  Tubal Ligation:  no        History:  History reviewed. No pertinent past medical history.  OB History    Para Term  AB Living   2 2 2     2   SAB TAB Ectopic Molar Multiple Live Births           0 2      # Outcome Date GA Lbr Andre/2nd Weight Sex Delivery Anes PTL Lv   2 Term 19 39w4d 09:48 / 00:34 3.61 kg (7 lb 15.3 oz) F Vag-Spont EPI N ESTELLA   1 Term 11 39w5d  3.558 kg (7 lb 13.5 oz) F Vag-Spont OTHER N ESTELLA      Birth Comments:  cervical lac repaired 1 stitch, 3rd degree perineal lac repaired.        Patient has no known allergies.  Patient Active Problem List    Diagnosis Date Noted   • Postpartum care and examination of lactating mother 2019   • Syphilis in pregnancy, antepartum, third trimester 10/01/2019        Hospital Course:   24 y.o. , now para 2, was admitted with the above mentioned diagnosis, underwent Active Labor, vaginal, spontaneous. Patient postpartum course was unremarkable, with progressive advancement in diet , ambulation and toleration of oral analgesia. Patient without complaints today and desires discharge.      Vitals:    19 1416 19 1800 19 0102 19 0600   BP:  112/77  109/61   Pulse: (!) 110 (!) 108 (!) 102 91   Resp:  18  18   Temp:  36.6 °C (97.9 °F) 37.1 °C (98.8 °F) 36.6 °C (97.8 °F)   TempSrc:  Temporal  Temporal   SpO2:  97% 99% 95%   Weight:       Height:           Current Facility-Administered Medications   Medication Dose   • ondansetron (ZOFRAN ODT) dispertab 4 mg  4 mg    Or   • ondansetron (ZOFRAN) syringe/vial injection 4 mg  4 mg   • oxytocin (PITOCIN) infusion (for postpartum)   mL/hr   • oxytocin (PITOCIN) infusion (for induction)  0.5-20  rosaura-units/min   • ropivacaine (NAROPIN) injection     • LR infusion     • docusate sodium (COLACE) capsule 100 mg  100 mg   • prenatal plus vitamin (STUARTNATAL 1+1) 27-1 MG tablet 1 Tab  1 Tab   • ibuprofen (MOTRIN) tablet 600 mg  600 mg   • acetaminophen (TYLENOL) tablet 650 mg  650 mg       Exam:  Breast Exam: negative  Abdomen: Abdomen soft, non-tender. BS normal. No masses,  No organomegaly  Fundus Non Tender: yes  Incision: none  Perineum: perineum intact  Extremity: extremities, peripheral pulses and reflexes normal, no edema, redness or tenderness in the calves or thighs     Labs:  Recent Labs     11/26/19  0235 11/26/19  1749   WBC 12.9* 15.8*   RBC 4.46 3.73*   HEMOGLOBIN 14.4 11.9*   HEMATOCRIT 42.4 35.8*   MCV 95.1 96.0   MCH 32.3 31.9   MCHC 34.0 33.2*   RDW 43.5 43.5   PLATELETCT 193 191   MPV 10.6 10.7        Activity:   Discharge to home  Pelvic Rest x 6 weeks    Assessment:  normal postpartum course  Discharge Assessment: No areas of skin breakdown/redness; surgical incision intact/healing     Follow up: .[facility] or [facility] in 5 weeks for vaginal ; 1 week for incision check.      Discharge Meds:   Current Outpatient Medications   Medication Sig Dispense Refill   • ibuprofen (MOTRIN) 600 MG Tab Take 1 Tab by mouth every 6 hours as needed for Mild Pain. 30 Tab 2       GUANAKITO Perez.

## 2019-11-27 NOTE — CARE PLAN
Problem: Altered physiologic condition related to immediate post-delivery state and potential for bleeding/hemorrhage  Goal: Patient physiologically stable as evidenced by normal lochia, palpable uterine involution and vital signs within normal limits  Outcome: PROGRESSING AS EXPECTED  Note:   Fundus firm, lochia light. VSS.      Problem: Potential for postpartum infection related to presence of episiotomy/vaginal tear and/or uterine contamination  Goal: Patient will be absent from signs and symptoms of infection  Outcome: PROGRESSING AS EXPECTED  Note:   No s/s of infection VSS.

## 2019-11-27 NOTE — CARE PLAN
Problem: Alteration in comfort related to episiotomy, vaginal repair and/or after birth pains  Goal: Patient verbalizes acceptable pain level  Outcome: PROGRESSING AS EXPECTED  Note:   Patient states pain is tolerable with pain medication.  Will continue to reassess and medicate accordingly to 0-10 pain scale.      Problem: Potential for postpartum infection related to presence of episiotomy/vaginal tear and/or uterine contamination  Goal: Patient will be absent from signs and symptoms of infection  Outcome: PROGRESSING SLOWER THAN EXPECTED  Note:   Patient shows no s/s of infection.  Will continue to monitor with hourly rounding.

## 2019-11-27 NOTE — PROGRESS NOTES
Received report from JARAD Degroot. Infant in the NBN under the care of the nursery RN. Pt resting in bed, discussed plan of care and pain management for the night. Pt states she will call staff if she requires pain interventions or assistance. No further needs at this time.

## 2019-11-28 VITALS
WEIGHT: 186 LBS | RESPIRATION RATE: 18 BRPM | SYSTOLIC BLOOD PRESSURE: 112 MMHG | DIASTOLIC BLOOD PRESSURE: 87 MMHG | TEMPERATURE: 98.1 F | BODY MASS INDEX: 34.23 KG/M2 | HEIGHT: 62 IN | OXYGEN SATURATION: 97 % | HEART RATE: 97 BPM

## 2019-11-28 PROCEDURE — 700102 HCHG RX REV CODE 250 W/ 637 OVERRIDE(OP): Performed by: NURSE PRACTITIONER

## 2019-11-28 PROCEDURE — A9270 NON-COVERED ITEM OR SERVICE: HCPCS | Performed by: NURSE PRACTITIONER

## 2019-11-28 RX ADMIN — IBUPROFEN 600 MG: 600 TABLET ORAL at 02:53

## 2019-11-28 RX ADMIN — VITAMIN A, VITAMIN C, VITAMIN D-3, VITAMIN E, VITAMIN B-1, VITAMIN B-2, NIACIN, VITAMIN B-6, CALCIUM, IRON, ZINC, COPPER 1 TABLET: 4000; 120; 400; 22; 1.84; 3; 20; 10; 1; 12; 200; 27; 25; 2 TABLET ORAL at 05:53

## 2019-11-28 RX ADMIN — ACETAMINOPHEN 650 MG: 325 TABLET, FILM COATED ORAL at 05:53

## 2019-11-28 RX ADMIN — IBUPROFEN 600 MG: 600 TABLET ORAL at 10:54

## 2019-11-28 NOTE — DISCHARGE SUMMARY
Discharge Summary:      Gypsy Hitchcock    Admit Date:   2019  Discharge Date:  2019     Admitting diagnosis:  Pregnancy  Labor and delivery, indication for care  Discharge Diagnosis: Status post vaginal, spontaneous.  Pregnancy Complications: hx syphilis, neg testing during pregnancy  Tubal Ligation:  no        History:  History reviewed. No pertinent past medical history.  OB History    Para Term  AB Living   2 2 2     2   SAB TAB Ectopic Molar Multiple Live Births           0 2      # Outcome Date GA Lbr Andre/2nd Weight Sex Delivery Anes PTL Lv   2 Term 19 39w4d 09:48 / 00:34 3.61 kg (7 lb 15.3 oz) F Vag-Spont EPI N ESTELLA   1 Term 11 39w5d  3.558 kg (7 lb 13.5 oz) F Vag-Spont OTHER N ESTELLA      Birth Comments:  cervical lac repaired 1 stitch, 3rd degree perineal lac repaired.        Patient has no known allergies.  Patient Active Problem List    Diagnosis Date Noted   • Postpartum care and examination of lactating mother 2019   • Syphilis in pregnancy, antepartum, third trimester 10/01/2019        Hospital Course:   24 y.o. , now para 2, was admitted with the above mentioned diagnosis, underwent Active Labor, vaginal, spontaneous. Patient postpartum course was unremarkable, with progressive advancement in diet , ambulation and toleration of oral analgesia. Patient without complaints today and desires discharge.      Vitals:    19 0102 19 0600 19 1800 19 0600   BP:  109/61 128/93 112/87   Pulse: (!) 102 91 (!) 103 97   Resp:  18 16 18   Temp: 37.1 °C (98.8 °F) 36.6 °C (97.8 °F) 36.2 °C (97.2 °F) 36.7 °C (98.1 °F)   TempSrc:  Temporal Temporal Temporal   SpO2: 99% 95% 97% 97%   Weight:       Height:           Current Facility-Administered Medications   Medication Dose   • ondansetron (ZOFRAN ODT) dispertab 4 mg  4 mg    Or   • ondansetron (ZOFRAN) syringe/vial injection 4 mg  4 mg   • oxytocin (PITOCIN) infusion (for postpartum)    mL/hr   • oxytocin (PITOCIN) infusion (for induction)  0.5-20 rosaura-units/min   • ropivacaine (NAROPIN) injection     • LR infusion     • docusate sodium (COLACE) capsule 100 mg  100 mg   • prenatal plus vitamin (STUARTNATAL 1+1) 27-1 MG tablet 1 Tab  1 Tab   • ibuprofen (MOTRIN) tablet 600 mg  600 mg   • acetaminophen (TYLENOL) tablet 650 mg  650 mg       Exam:  Breast Exam: negative  Abdomen: Abdomen soft, non-tender. BS normal. No masses,  No organomegaly  Fundus Non Tender: yes  Incision: none  Perineum: perineum intact  Extremity: extremities, peripheral pulses and reflexes normal     Labs:  Recent Labs     11/26/19  0235 11/26/19  1749   WBC 12.9* 15.8*   RBC 4.46 3.73*   HEMOGLOBIN 14.4 11.9*   HEMATOCRIT 42.4 35.8*   MCV 95.1 96.0   MCH 32.3 31.9   MCHC 34.0 33.2*   RDW 43.5 43.5   PLATELETCT 193 191   MPV 10.6 10.7        Activity:   Discharge to home  Pelvic Rest x 6 weeks    Assessment:  normal postpartum course  Discharge Assessment: No areas of skin breakdown/redness; surgical incision intact/healing     Follow up: [facility] or [facility] in 5 weeks for vaginal ; 1 week for incision check.      Discharge Meds:   Current Outpatient Medications   Medication Sig Dispense Refill   • ibuprofen (MOTRIN) 600 MG Tab Take 1 Tab by mouth every 6 hours as needed for Mild Pain. 30 Tab 2       ANNMARIE Goldberg.

## 2019-11-28 NOTE — LACTATION NOTE
MOB reports baby cluster fed last night. She reports baby has been latching well, and denies nipple pain or breakdown. MOB using pump at this time and yields nearly an ounce on 1 breast. MOB and FOB have questions regarding breastfeeding frequency which I answered and discussed normal feeding frequency for first 2 weeks, including cluster feeding and importance of nighttime feedings. MOB encouraged to call for latch assessment prior to d/c.

## 2019-11-28 NOTE — PROGRESS NOTES
Discharge teaching reviewed with patient, all questions answered. Pt given all written information on self and infant care, including prescriptions and follow-up instructions. Pt doing well with infant, and feels comfortable with her feeding plan.  Discharged to home at 1332. Escorted out in wheelchair by staff.

## 2019-11-28 NOTE — PROGRESS NOTES
Bedside report done, patient in bed with FOB at bedside. Denies pain at this time. Will continue to monitor.

## 2019-11-28 NOTE — CARE PLAN
Problem: Altered physiologic condition related to immediate post-delivery state and potential for bleeding/hemorrhage  Goal: Patient physiologically stable as evidenced by normal lochia, palpable uterine involution and vital signs within normal limits  Outcome: PROGRESSING AS EXPECTED  Note:   Fundus firm at U, lochia rubra minimal. V/S WNL     Problem: Potential for postpartum infection related to presence of episiotomy/vaginal tear and/or uterine contamination  Goal: Patient will be absent from signs and symptoms of infection  Outcome: PROGRESSING AS EXPECTED  Note:   Patient has no signs of infection noted  at this time.

## 2019-11-28 NOTE — DISCHARGE INSTRUCTIONS
POSTPARTUM DISCHARGE INSTRUCTIONS FOR MOM    YOB: 1995   Age: 24 y.o.               Admit Date: 2019     Discharge Date: 2019  Attending Doctor:  GILMA Baxter*                  Allergies:  Patient has no known allergies.    Discharged to home by car. Discharged via wheelchair, hospital escort: Yes.  Special equipment needed: Not Applicable  Belongings with: Personal  Be sure to schedule a follow-up appointment with your primary care doctor or any specialists as instructed.     Discharge Plan:   Diet Plan: Discussed  Activity Level: Discussed  Confirmed Follow up Appointment: Patient to Call and Schedule Appointment  Confirmed Symptoms Management: Discussed  Medication Reconciliation Updated: Yes  Influenza Vaccine Indication: Not indicated: Previously immunized this influenza season and > 8 years of age    REASONS TO CALL YOUR OBSTETRICIAN:  1.   Persistent fever or shaking chills (Temperature higher than 100.4)  2.   Heavy bleeding (soaking more than 1 pad per hour); Passing clots  3.   Foul odor from vagina  4.   Mastitis (Breast infection; breast pain, chills, fever, redness)  5.   Urinary pain, burning or frequency  6.   Episiotomy infection  7.   Abdominal incision infection  8.   Severe depression longer than 24 hours    HAND WASHING  · Prior to handling the baby.  · Before breastfeeding or bottle feeding baby.  · After using the bathroom or changing the baby's diaper.    WOUND CARE  Ask your physician for additional care instructions.  In general:    ·  Incision:      · Keep clean and dry.    · Do NOT lift anything heavier than your baby for up to 6 weeks.    · There should not be any opening or pus.      VAGINAL CARE  · Nothing inside vagina for 6 weeks: no sexual intercourse, tampons or douching.  · Bleeding may continue for 2-4 weeks.  Amount may vary.    · Call your physician for heavy bleeding which means soaking more than 1 pad per hour    BIRTH CONTROL  · It  "is possible to become pregnant at any time after delivery and while breastfeeding.  · Plan to discuss a method of birth control with your physician at your follow up visit. visit.    DIET AND ELIMINATION  · Eating more fiber (bran cereal, fruits, and vegetables) and drinking plenty of fluids will help to avoid constipation.  · Urinary frequency after childbirth is normal.    POSTPARTUM BLUES  During the first few days after birth, you may experience a sense of the \"blues\" which may include impatience, irritability or even crying.  These feeling come and go quickly.  However, as many as 1 in 10 women experience emotional symptoms known as postpartum depression.    Postpartum depression:  May start as early as the second or third day after delivery or take several weeks or months to develop.  Symptoms of \"blues\" are present, but are more intense:  Crying spells; loss of appetite; feelings of hopelessness or loss of control; fear of touching the baby; over concern or no concern at all about the baby; little or no concern about your own appearance/caring for yourself; and/or inability to sleep or excessive sleeping.  Contact your physician if you are experiencing any of these symptoms.    Crisis Hotline:  · [place] Crisis Hotline:  0-722-QCAJLKQ  Or 1-407.417.5116  · [place] Crisis Hotline:  1-750.967.5187  Or 954-382-3981    PREVENTING SHAKEN BABY:  If you are angry or stressed, PUT THE BABY IN THE CRIB, step away, take some deep breaths, and wait until you are calm to care for the baby.  DO NOT SHAKE THE BABY.  You are not alone, call a supporter for help.    · Crisis Call Center 24/7 crisis line 480-108-3897 or 1-579.637.1107  · You can also text them, text \"ANSWER\" to 835055    QUIT SMOKING/TOBACCO USE:  I understand the use of any tobacco products increases my chance of suffering from future heart disease and could cause other illnesses which may shorten my life. Quitting the use of tobacco products is the single " most important thing I can do to improve my health. For further information on smoking / tobacco cessation call a Toll Free Quit Line at 1-677.324.6880 (*[employer] [place]) or 1-303.216.9585 (American Lung Association) or you can access the web based program at www.lungusa.org.    · [place] Tobacco Users Help Line:  (135) 781-3477       Toll Free: 1-311.395.1928  · Quit Tobacco Program [facility] (952)399-0611    DEPRESSION / SUICIDE RISK:  As you are discharged from this [facility], it is important to learn how to keep safe from harming yourself.    Recognize the warning signs:  · Abrupt changes in personality, positive or negative- including increase in energy   · Giving away possessions  · Change in eating patterns- significant weight changes-  positive or negative  · Change in sleeping patterns- unable to sleep or sleeping all the time   · Unwillingness or inability to communicate  · Depression  · Unusual sadness, discouragement and loneliness  · Talk of wanting to die  · Neglect of personal appearance   · Rebelliousness- reckless behavior  · Withdrawal from people/activities they love  · Confusion- inability to concentrate     If you or a loved one observes any of these behaviors or has concerns about self-harm, here's what you can do:  · Talk about it- your feelings and reasons for harming yourself  · Remove any means that you might use to hurt yourself (examples: pills, rope, extension cords, firearm)  · Get professional help from the community (Mental Health, Substance Abuse, psychological counseling)  · Do not be alone:Call your Safe Contact- someone whom you trust who will be there for you.  · Call your local CRISIS HOTLINE 875-5416 or 633-254-9603  · Call your local Children's Mobile Crisis Response Team [place] (222) 175-4791 or www.VeriTweet  · Call the toll free National Suicide Prevention Hotlines   · National Suicide Prevention Lifeline  627-181-FZOC (1376)  · [facility] 800-SUICIDE (078-1419)    DISCHARGE SURVEY:  Thank you for choosing [facility].  We hope we provided you with very good care.  You may be receiving a survey in the mail.  Please fill it out.  Your opinion is valuable to us.    ADDITIONAL EDUCATIONAL MATERIALS GIVEN TO PATIENT:  Pelvic rest for 6 weeks   Ambulate   Encourage breastfeeding   Depo-shot 150 mg IM prior to discharge if indicated     My signature on this form indicates that:  1.  I have reviewed and understand the above information  2.  My questions regarding this information have been answered to my satisfaction.  3.  I have formulated a plan with my discharge nurse to obtain my prescribed medication for home.

## 2019-11-28 NOTE — CARE PLAN
Problem: Potential for postpartum infection related to presence of episiotomy/vaginal tear and/or uterine contamination  Goal: Patient will be absent from signs and symptoms of infection  Outcome: PROGRESSING AS EXPECTED  Note:   Patient shows no s/s of infection.  Will continue to monitor with hourly rounding.      Problem: Alteration in comfort related to episiotomy, vaginal repair and/or after birth pains  Goal: Patient verbalizes acceptable pain level  Outcome: PROGRESSING AS EXPECTED  Note:   Patient states pain is tolerable with pain medication.  Will continue to reassess with rounding and medicate accordingly to 0-10 pain scale.

## 2019-12-26 ENCOUNTER — TELEPHONE (OUTPATIENT)
Dept: OBGYN | Facility: CLINIC | Age: 24
End: 2019-12-26

## 2019-12-26 NOTE — TELEPHONE ENCOUNTER
Pt called inquiring about support groups with Yevgeniy Nathan. Pt c/o feeling sad, depressed and anxious since she had her baby and reports she has been crying a lot, pt denies any SI and HI.  Per CHERYL Nathan, she does not have support groups.  Consulted with midwife Kathe Reich and recommends pt to set up appt with Yevgeniy Nathan, if at any time she has SI or HI, depression or anxiety is severe then she needs to go to ER for further evaluation. Notified pt, voiced understanding and agrees to plan. Adv pt Yevgeniy's Feb schedule is not available yet but we'll put her on waiting list (done by Claire)

## 2020-01-14 ENCOUNTER — POST PARTUM (OUTPATIENT)
Dept: OBGYN | Facility: CLINIC | Age: 25
End: 2020-01-14

## 2020-01-14 VITALS — SYSTOLIC BLOOD PRESSURE: 112 MMHG | DIASTOLIC BLOOD PRESSURE: 68 MMHG | BODY MASS INDEX: 30 KG/M2 | WEIGHT: 164 LBS

## 2020-01-14 PROCEDURE — 0503F POSTPARTUM CARE VISIT: CPT | Performed by: PHYSICIAN ASSISTANT

## 2020-01-14 RX ORDER — MEDROXYPROGESTERONE ACETATE 150 MG/ML
150 INJECTION, SUSPENSION INTRAMUSCULAR ONCE
Qty: 1 VIAL | Refills: 3 | Status: SHIPPED | OUTPATIENT
Start: 2020-01-14 | End: 2020-01-14

## 2020-01-14 ASSESSMENT — EDINBURGH POSTNATAL DEPRESSION SCALE (EPDS)
TOTAL SCORE: 7
I HAVE LOOKED FORWARD WITH ENJOYMENT TO THINGS: AS MUCH AS I EVER DID
THINGS HAVE BEEN GETTING ON TOP OF ME: NO, MOST OF THE TIME I HAVE COPED QUITE WELL
I HAVE FELT SAD OR MISERABLE: NOT VERY OFTEN
THE THOUGHT OF HARMING MYSELF HAS OCCURRED TO ME: NEVER
I HAVE FELT SCARED OR PANICKY FOR NO GOOD REASON: YES, SOMETIMES
I HAVE BEEN ANXIOUS OR WORRIED FOR NO GOOD REASON: YES, SOMETIMES
I HAVE BEEN SO UNHAPPY THAT I HAVE BEEN CRYING: NO, NEVER
I HAVE BEEN ABLE TO LAUGH AND SEE THE FUNNY SIDE OF THINGS: AS MUCH AS I ALWAYS COULD
I HAVE BLAMED MYSELF UNNECESSARILY WHEN THINGS WENT WRONG: NOT VERY OFTEN
I HAVE BEEN SO UNHAPPY THAT I HAVE HAD DIFFICULTY SLEEPING: NOT AT ALL

## 2020-01-14 ASSESSMENT — ENCOUNTER SYMPTOMS
RESPIRATORY NEGATIVE: 1
EYES NEGATIVE: 1
NEUROLOGICAL NEGATIVE: 1
MUSCULOSKELETAL NEGATIVE: 1
CONSTITUTIONAL NEGATIVE: 1
GASTROINTESTINAL NEGATIVE: 1
CARDIOVASCULAR NEGATIVE: 1
PSYCHIATRIC NEGATIVE: 1

## 2020-01-14 NOTE — PROGRESS NOTES
Pt here today for postpartum exam.  Delivery Date 11/26/19  Currently: bottle feeding   BCM: pt would like Nexplanon, [facility].   Good ph: [phone]  Pt states no complaints   Chaperone offered and declined  PAP WNL 08/2019

## 2020-01-15 NOTE — PROGRESS NOTES
Subjective:      Gypsy Hitchcock is a 24 y.o. female who presents with Postpartum visit today. 5 wk s/p  at term without complications. Pt has no complaints- denies heavy vaginal bleeding, depression, pain or BF. Pt has had intercourse, using withdrawal method. Pt warned about efficacy and risk of pregnancy. Pt wants Nexplanon but will use DMPA in meantime, so will call in rx today, pt to call [facility] for appt for Nexplanon. PAP wnl  - repeat .           HPI    Review of Systems   Constitutional: Negative.    HENT: Negative.    Eyes: Negative.    Respiratory: Negative.    Cardiovascular: Negative.    Gastrointestinal: Negative.    Genitourinary: Negative.    Musculoskeletal: Negative.    Skin: Negative.    Neurological: Negative.    Endo/Heme/Allergies: Negative.    Psychiatric/Behavioral: Negative.    All other systems reviewed and are negative.         Objective:     /68   Wt 74.4 kg (164 lb)   BMI 30.00 kg/m²      Physical Exam  Vitals signs reviewed.   Constitutional:       Appearance: She is well-developed.   HENT:      Head: Normocephalic and atraumatic.   Eyes:      Pupils: Pupils are equal, round, and reactive to light.   Neck:      Musculoskeletal: Normal range of motion and neck supple.      Thyroid: No thyromegaly.   Cardiovascular:      Rate and Rhythm: Normal rate and regular rhythm.      Heart sounds: Normal heart sounds.   Pulmonary:      Effort: Pulmonary effort is normal. No respiratory distress.      Breath sounds: Normal breath sounds.   Abdominal:      General: Bowel sounds are normal. There is no distension.      Palpations: Abdomen is soft.      Tenderness: There is no tenderness.   Genitourinary:     Exam position: Supine.      Labia:         Right: No rash or tenderness.         Left: No rash or tenderness.       Vagina: Normal. No signs of injury and foreign body. No vaginal discharge or erythema.      Cervix: No cervical motion tenderness.      Uterus: Not deviated,  not enlarged and not tender.       Adnexa:         Right: No mass or tenderness.          Left: No mass or tenderness.     Skin:     General: Skin is warm and dry.      Findings: No erythema.   Neurological:      Mental Status: She is alert.      Deep Tendon Reflexes: Reflexes are normal and symmetric.   Psychiatric:         Behavior: Behavior normal.         Thought Content: Thought content normal.                 Assessment/Plan:       1. Postpartum care and examination of lactating mother  - Depo Provera rx called in today, pt to RTC 1 day for administration  - Pt to call [facility] for Nexplanon appt

## 2021-08-14 ENCOUNTER — HOSPITAL ENCOUNTER (EMERGENCY)
Facility: MEDICAL CENTER | Age: 26
End: 2021-08-15

## 2021-08-14 VITALS
RESPIRATION RATE: 18 BRPM | SYSTOLIC BLOOD PRESSURE: 135 MMHG | WEIGHT: 162.7 LBS | TEMPERATURE: 98 F | BODY MASS INDEX: 29.94 KG/M2 | HEART RATE: 85 BPM | OXYGEN SATURATION: 96 % | HEIGHT: 62 IN | DIASTOLIC BLOOD PRESSURE: 94 MMHG

## 2021-08-14 PROCEDURE — 302449 STATCHG TRIAGE ONLY (STATISTIC)

## 2021-08-15 NOTE — ED NOTES
Patient said she did not want to wait any longer. And signed AMA form. Told to come back if it gets worse. Patient verbalized understanding and left.    Patient

## 2021-08-15 NOTE — ED TRIAGE NOTES
Chief Complaint   Patient presents with   • Numbness     left arm numbness started 20 min ago, pt also feels cold and out of breath, no pain      Pt ambulatory to triage for above complaint.      Pt is alert/oriented and follows commands. Pt speaking in full sentences and responds appropriately to questions. No acute distress noted in triage and respirations are even and unlabored.     Pt placed in lobby and educated on triage process. Pt encouraged to alert staff for any changes in condition.

## 2022-10-19 ENCOUNTER — HOSPITAL ENCOUNTER (INPATIENT)
Facility: MEDICAL CENTER | Age: 27
LOS: 2 days | End: 2022-10-21
Attending: SPECIALIST | Admitting: STUDENT IN AN ORGANIZED HEALTH CARE EDUCATION/TRAINING PROGRAM
Payer: COMMERCIAL

## 2022-10-19 ENCOUNTER — APPOINTMENT (OUTPATIENT)
Dept: RADIOLOGY | Facility: MEDICAL CENTER | Age: 27
End: 2022-10-19
Attending: SPECIALIST
Payer: COMMERCIAL

## 2022-10-19 DIAGNOSIS — R10.2 PELVIC PAIN: Primary | ICD-10-CM

## 2022-10-19 LAB
ABO GROUP BLD: NORMAL
APPEARANCE UR: CLEAR
BACTERIA #/AREA URNS HPF: ABNORMAL /HPF
BILIRUB UR QL STRIP.AUTO: NEGATIVE
BLD GP AB SCN SERPL QL: NORMAL
C TRACH DNA SPEC QL NAA+PROBE: NEGATIVE
COLOR UR: YELLOW
EPI CELLS #/AREA URNS HPF: ABNORMAL /HPF
GLUCOSE UR STRIP.AUTO-MCNC: NEGATIVE MG/DL
GP B STREP DNA SPEC QL NAA+PROBE: NEGATIVE
HIV 1+2 AB+HIV1 P24 AG SERPL QL IA: NORMAL
HYALINE CASTS #/AREA URNS LPF: ABNORMAL /LPF
KETONES UR STRIP.AUTO-MCNC: ABNORMAL MG/DL
LEUKOCYTE ESTERASE UR QL STRIP.AUTO: ABNORMAL
MICRO URNS: ABNORMAL
N GONORRHOEA DNA SPEC QL NAA+PROBE: NEGATIVE
NITRITE UR QL STRIP.AUTO: NEGATIVE
PH UR STRIP.AUTO: 6.5 [PH] (ref 5–8)
PROT UR QL STRIP: NEGATIVE MG/DL
RBC # URNS HPF: ABNORMAL /HPF
RBC UR QL AUTO: NEGATIVE
RH BLD: NORMAL
SP GR UR STRIP.AUTO: 1.01
SPECIMEN SOURCE: NORMAL
UROBILINOGEN UR STRIP.AUTO-MCNC: 0.2 MG/DL
WBC #/AREA URNS HPF: ABNORMAL /HPF

## 2022-10-19 PROCEDURE — 302449 STATCHG TRIAGE ONLY (STATISTIC)

## 2022-10-19 PROCEDURE — 81001 URINALYSIS AUTO W/SCOPE: CPT

## 2022-10-19 PROCEDURE — 36415 COLL VENOUS BLD VENIPUNCTURE: CPT

## 2022-10-19 PROCEDURE — 700105 HCHG RX REV CODE 258: Performed by: STUDENT IN AN ORGANIZED HEALTH CARE EDUCATION/TRAINING PROGRAM

## 2022-10-19 PROCEDURE — 87491 CHLMYD TRACH DNA AMP PROBE: CPT

## 2022-10-19 PROCEDURE — 700111 HCHG RX REV CODE 636 W/ 250 OVERRIDE (IP): Performed by: STUDENT IN AN ORGANIZED HEALTH CARE EDUCATION/TRAINING PROGRAM

## 2022-10-19 PROCEDURE — 86762 RUBELLA ANTIBODY: CPT

## 2022-10-19 PROCEDURE — 86592 SYPHILIS TEST NON-TREP QUAL: CPT

## 2022-10-19 PROCEDURE — 87081 CULTURE SCREEN ONLY: CPT

## 2022-10-19 PROCEDURE — 87150 DNA/RNA AMPLIFIED PROBE: CPT

## 2022-10-19 PROCEDURE — 85025 COMPLETE CBC W/AUTO DIFF WBC: CPT

## 2022-10-19 PROCEDURE — 770002 HCHG ROOM/CARE - OB PRIVATE (112)

## 2022-10-19 PROCEDURE — 86900 BLOOD TYPING SEROLOGIC ABO: CPT

## 2022-10-19 PROCEDURE — 76820 UMBILICAL ARTERY ECHO: CPT

## 2022-10-19 PROCEDURE — 87389 HIV-1 AG W/HIV-1&-2 AB AG IA: CPT

## 2022-10-19 PROCEDURE — 86803 HEPATITIS C AB TEST: CPT

## 2022-10-19 PROCEDURE — 86780 TREPONEMA PALLIDUM: CPT

## 2022-10-19 PROCEDURE — 86901 BLOOD TYPING SEROLOGIC RH(D): CPT

## 2022-10-19 PROCEDURE — 87591 N.GONORRHOEAE DNA AMP PROB: CPT

## 2022-10-19 PROCEDURE — 86850 RBC ANTIBODY SCREEN: CPT

## 2022-10-19 PROCEDURE — 87653 STREP B DNA AMP PROBE: CPT

## 2022-10-19 PROCEDURE — 87340 HEPATITIS B SURFACE AG IA: CPT

## 2022-10-19 PROCEDURE — 76819 FETAL BIOPHYS PROFIL W/O NST: CPT

## 2022-10-19 RX ORDER — SODIUM CHLORIDE, SODIUM LACTATE, POTASSIUM CHLORIDE, CALCIUM CHLORIDE 600; 310; 30; 20 MG/100ML; MG/100ML; MG/100ML; MG/100ML
INJECTION, SOLUTION INTRAVENOUS CONTINUOUS
Status: DISCONTINUED | OUTPATIENT
Start: 2022-10-19 | End: 2022-10-20

## 2022-10-19 RX ORDER — TERBUTALINE SULFATE 1 MG/ML
0.25 INJECTION, SOLUTION SUBCUTANEOUS
Status: DISCONTINUED | OUTPATIENT
Start: 2022-10-19 | End: 2022-10-20 | Stop reason: HOSPADM

## 2022-10-19 RX ORDER — ACETAMINOPHEN 500 MG
1000 TABLET ORAL
Status: COMPLETED | OUTPATIENT
Start: 2022-10-19 | End: 2022-10-20

## 2022-10-19 RX ORDER — ONDANSETRON 2 MG/ML
4 INJECTION INTRAMUSCULAR; INTRAVENOUS EVERY 6 HOURS PRN
Status: DISCONTINUED | OUTPATIENT
Start: 2022-10-19 | End: 2022-10-20 | Stop reason: HOSPADM

## 2022-10-19 RX ORDER — ONDANSETRON 4 MG/1
4 TABLET, ORALLY DISINTEGRATING ORAL EVERY 6 HOURS PRN
Status: DISCONTINUED | OUTPATIENT
Start: 2022-10-19 | End: 2022-10-20 | Stop reason: HOSPADM

## 2022-10-19 RX ORDER — OXYTOCIN 10 [USP'U]/ML
10 INJECTION, SOLUTION INTRAMUSCULAR; INTRAVENOUS
Status: DISCONTINUED | OUTPATIENT
Start: 2022-10-19 | End: 2022-10-20 | Stop reason: HOSPADM

## 2022-10-19 RX ORDER — IBUPROFEN 800 MG/1
800 TABLET ORAL
Status: COMPLETED | OUTPATIENT
Start: 2022-10-19 | End: 2022-10-20

## 2022-10-19 RX ORDER — LIDOCAINE HYDROCHLORIDE 10 MG/ML
20 INJECTION, SOLUTION INFILTRATION; PERINEURAL
Status: DISCONTINUED | OUTPATIENT
Start: 2022-10-19 | End: 2022-10-20 | Stop reason: HOSPADM

## 2022-10-19 RX ADMIN — OXYTOCIN 2 MILLI-UNITS/MIN: 10 INJECTION, SOLUTION INTRAMUSCULAR; INTRAVENOUS at 20:14

## 2022-10-19 RX ADMIN — SODIUM CHLORIDE, POTASSIUM CHLORIDE, SODIUM LACTATE AND CALCIUM CHLORIDE: 600; 310; 30; 20 INJECTION, SOLUTION INTRAVENOUS at 20:14

## 2022-10-19 ASSESSMENT — LIFESTYLE VARIABLES
TOTAL SCORE: 0
ALCOHOL_USE: NO
TOTAL SCORE: 0
HAVE PEOPLE ANNOYED YOU BY CRITICIZING YOUR DRINKING: NO
EVER FELT BAD OR GUILTY ABOUT YOUR DRINKING: NO
EVER HAD A DRINK FIRST THING IN THE MORNING TO STEADY YOUR NERVES TO GET RID OF A HANGOVER: NO
CONSUMPTION TOTAL: INCOMPLETE
EVER_SMOKED: NEVER
HAVE YOU EVER FELT YOU SHOULD CUT DOWN ON YOUR DRINKING: NO
TOTAL SCORE: 0

## 2022-10-19 ASSESSMENT — PATIENT HEALTH QUESTIONNAIRE - PHQ9
SUM OF ALL RESPONSES TO PHQ9 QUESTIONS 1 AND 2: 0
1. LITTLE INTEREST OR PLEASURE IN DOING THINGS: NOT AT ALL
2. FEELING DOWN, DEPRESSED, IRRITABLE, OR HOPELESS: NOT AT ALL

## 2022-10-19 ASSESSMENT — PAIN SCALES - GENERAL: PAINLEVEL: 0 - NO PAIN

## 2022-10-20 LAB
BASOPHILS # BLD AUTO: 0.2 % (ref 0–1.8)
BASOPHILS # BLD: 0.02 K/UL (ref 0–0.12)
EOSINOPHIL # BLD AUTO: 0.05 K/UL (ref 0–0.51)
EOSINOPHIL NFR BLD: 0.6 % (ref 0–6.9)
ERYTHROCYTE [DISTWIDTH] IN BLOOD BY AUTOMATED COUNT: 43.8 FL (ref 35.9–50)
ERYTHROCYTE [DISTWIDTH] IN BLOOD BY AUTOMATED COUNT: 45.1 FL (ref 35.9–50)
GP B STREP DNA SPEC QL NAA+PROBE: NEGATIVE
HBV SURFACE AG SER QL: ABNORMAL
HCT VFR BLD AUTO: 35.1 % (ref 37–47)
HCT VFR BLD AUTO: 36.1 % (ref 37–47)
HCV AB SER QL: ABNORMAL
HGB BLD-MCNC: 12.1 G/DL (ref 12–16)
HGB BLD-MCNC: 12.2 G/DL (ref 12–16)
IMM GRANULOCYTES # BLD AUTO: 0.04 K/UL (ref 0–0.11)
IMM GRANULOCYTES NFR BLD AUTO: 0.4 % (ref 0–0.9)
LYMPHOCYTES # BLD AUTO: 2.27 K/UL (ref 1–4.8)
LYMPHOCYTES NFR BLD: 25.2 % (ref 22–41)
MCH RBC QN AUTO: 29.1 PG (ref 27–33)
MCH RBC QN AUTO: 29.6 PG (ref 27–33)
MCHC RBC AUTO-ENTMCNC: 33.8 G/DL (ref 33.6–35)
MCHC RBC AUTO-ENTMCNC: 34.5 G/DL (ref 33.6–35)
MCV RBC AUTO: 85.8 FL (ref 81.4–97.8)
MCV RBC AUTO: 86.2 FL (ref 81.4–97.8)
MONOCYTES # BLD AUTO: 0.62 K/UL (ref 0–0.85)
MONOCYTES NFR BLD AUTO: 6.9 % (ref 0–13.4)
NEUTROPHILS # BLD AUTO: 6.02 K/UL (ref 2–7.15)
NEUTROPHILS NFR BLD: 66.7 % (ref 44–72)
NRBC # BLD AUTO: 0 K/UL
NRBC BLD-RTO: 0 /100 WBC
PLATELET # BLD AUTO: 246 K/UL (ref 164–446)
PLATELET # BLD AUTO: 265 K/UL (ref 164–446)
PMV BLD AUTO: 10.2 FL (ref 9–12.9)
PMV BLD AUTO: 9.9 FL (ref 9–12.9)
RBC # BLD AUTO: 4.09 M/UL (ref 4.2–5.4)
RBC # BLD AUTO: 4.19 M/UL (ref 4.2–5.4)
RUBV AB SER QL: >500 IU/ML
T PALLIDUM AB SER QL IA: ABNORMAL
WBC # BLD AUTO: 15.6 K/UL (ref 4.8–10.8)
WBC # BLD AUTO: 9 K/UL (ref 4.8–10.8)

## 2022-10-20 PROCEDURE — 770002 HCHG ROOM/CARE - OB PRIVATE (112)

## 2022-10-20 PROCEDURE — 85027 COMPLETE CBC AUTOMATED: CPT

## 2022-10-20 PROCEDURE — 700102 HCHG RX REV CODE 250 W/ 637 OVERRIDE(OP): Performed by: SPECIALIST

## 2022-10-20 PROCEDURE — 36415 COLL VENOUS BLD VENIPUNCTURE: CPT

## 2022-10-20 PROCEDURE — 59409 OBSTETRICAL CARE: CPT

## 2022-10-20 PROCEDURE — 700102 HCHG RX REV CODE 250 W/ 637 OVERRIDE(OP)

## 2022-10-20 PROCEDURE — 700105 HCHG RX REV CODE 258: Performed by: STUDENT IN AN ORGANIZED HEALTH CARE EDUCATION/TRAINING PROGRAM

## 2022-10-20 PROCEDURE — A9270 NON-COVERED ITEM OR SERVICE: HCPCS

## 2022-10-20 PROCEDURE — 304965 HCHG RECOVERY SERVICES

## 2022-10-20 PROCEDURE — 10907ZC DRAINAGE OF AMNIOTIC FLUID, THERAPEUTIC FROM PRODUCTS OF CONCEPTION, VIA NATURAL OR ARTIFICIAL OPENING: ICD-10-PCS | Performed by: STUDENT IN AN ORGANIZED HEALTH CARE EDUCATION/TRAINING PROGRAM

## 2022-10-20 PROCEDURE — A9270 NON-COVERED ITEM OR SERVICE: HCPCS | Performed by: SPECIALIST

## 2022-10-20 PROCEDURE — A9270 NON-COVERED ITEM OR SERVICE: HCPCS | Performed by: STUDENT IN AN ORGANIZED HEALTH CARE EDUCATION/TRAINING PROGRAM

## 2022-10-20 PROCEDURE — 700111 HCHG RX REV CODE 636 W/ 250 OVERRIDE (IP): Performed by: STUDENT IN AN ORGANIZED HEALTH CARE EDUCATION/TRAINING PROGRAM

## 2022-10-20 PROCEDURE — 700102 HCHG RX REV CODE 250 W/ 637 OVERRIDE(OP): Performed by: STUDENT IN AN ORGANIZED HEALTH CARE EDUCATION/TRAINING PROGRAM

## 2022-10-20 RX ORDER — MISOPROSTOL 200 UG/1
800 TABLET ORAL 4 TIMES DAILY
Status: DISCONTINUED | OUTPATIENT
Start: 2022-10-20 | End: 2022-10-20

## 2022-10-20 RX ORDER — IBUPROFEN 800 MG/1
800 TABLET ORAL EVERY 8 HOURS PRN
Status: DISCONTINUED | OUTPATIENT
Start: 2022-10-20 | End: 2022-10-21 | Stop reason: HOSPADM

## 2022-10-20 RX ORDER — ACETAMINOPHEN 500 MG
1000 TABLET ORAL EVERY 6 HOURS PRN
Status: DISCONTINUED | OUTPATIENT
Start: 2022-10-20 | End: 2022-10-21 | Stop reason: HOSPADM

## 2022-10-20 RX ORDER — DOCUSATE SODIUM 100 MG/1
100 CAPSULE, LIQUID FILLED ORAL 2 TIMES DAILY PRN
Status: DISCONTINUED | OUTPATIENT
Start: 2022-10-20 | End: 2022-10-21 | Stop reason: HOSPADM

## 2022-10-20 RX ORDER — MISOPROSTOL 200 UG/1
TABLET ORAL
Status: COMPLETED
Start: 2022-10-20 | End: 2022-10-20

## 2022-10-20 RX ORDER — SODIUM CHLORIDE, SODIUM LACTATE, POTASSIUM CHLORIDE, CALCIUM CHLORIDE 600; 310; 30; 20 MG/100ML; MG/100ML; MG/100ML; MG/100ML
INJECTION, SOLUTION INTRAVENOUS PRN
Status: DISCONTINUED | OUTPATIENT
Start: 2022-10-20 | End: 2022-10-21 | Stop reason: HOSPADM

## 2022-10-20 RX ADMIN — FENTANYL CITRATE 100 MCG: 50 INJECTION, SOLUTION INTRAMUSCULAR; INTRAVENOUS at 05:13

## 2022-10-20 RX ADMIN — OXYTOCIN 125 ML/HR: 10 INJECTION, SOLUTION INTRAMUSCULAR; INTRAVENOUS at 07:09

## 2022-10-20 RX ADMIN — ACETAMINOPHEN 1000 MG: 500 TABLET ORAL at 04:22

## 2022-10-20 RX ADMIN — IBUPROFEN 800 MG: 800 TABLET, FILM COATED ORAL at 20:53

## 2022-10-20 RX ADMIN — ACETAMINOPHEN 1000 MG: 500 TABLET ORAL at 15:25

## 2022-10-20 RX ADMIN — MISOPROSTOL 800 MCG: 200 TABLET ORAL at 06:32

## 2022-10-20 RX ADMIN — ACETAMINOPHEN 1000 MG: 500 TABLET ORAL at 09:30

## 2022-10-20 RX ADMIN — IBUPROFEN 800 MG: 800 TABLET, FILM COATED ORAL at 07:57

## 2022-10-20 RX ADMIN — ACETAMINOPHEN 1000 MG: 500 TABLET ORAL at 20:53

## 2022-10-20 RX ADMIN — OXYTOCIN 10 UNITS: 10 INJECTION, SOLUTION INTRAMUSCULAR; INTRAVENOUS at 06:30

## 2022-10-20 ASSESSMENT — PAIN DESCRIPTION - PAIN TYPE
TYPE: ACUTE PAIN
TYPE: ACUTE PAIN

## 2022-10-20 NOTE — H&P
26yo  at 38w 1d admitted from triage for induction of labor.  She presented tonight to L&D for decreased fetal movement.  Has not been seen in Dr. Farr's office since July, as she states she lost insurance.  Biophysical profile was performed and was 6/8 (two off for breathing) with elevated dopplers. Given elevated dopplers, decision was made to proceed with induction of labor.  Movement has improved.  Reports contractions increasing in frequency and pain.  No leakage of fluid or vaginal bleeding.    No known PMHx  No known PSHx  No allergies  No meds  ; term  x2  Fhx: non-contributory  Shx: no tobacco, alcohol, or drug use    Physical Exam:  Vitals reviewed in EMR; normotensive, afebrile  Gen: A&Ox3  CV: regular rate  Pulm: no increased WOB  Abd: gravid, nontender  SVE 2-3/80/-3    FHT: baseline 130bpm, moderate variability, +accels, rare variable decels  Laureles: regular contractions q2-3 min    Labs in EMR    Assessment and Plan:  26yo  at 38w 1d admitted for induction of labor for BPP 6/8 and elevated dopplers.    -- pitocin per protocol  -- prenatal labs obtained  -- vertex  -- O POS  -- epidural as desired  -- see orders for full plan    Ricci Chowdary DO, FACOG

## 2022-10-20 NOTE — CARE PLAN
Problem: Knowledge Deficit - L&D  Goal: Patient and family/caregivers will demonstrate understanding of plan of care, disease process/condition, diagnostic tests and medications  Outcome: Progressing   The patient is Stable - Low risk of patient condition declining or worsening

## 2022-10-20 NOTE — CARE PLAN
The patient is Stable - Low risk of patient condition declining or worsening    Shift Goals  Clinical Goals: cervical change  Patient Goals: healthy mom and baby    Progress made toward(s) clinical / shift goals:  Educated patient on pain rating scales, frequent pain assessments in place, medicating per MAR, non pharmaceutical pain relief such as  cooling pads and positioning in use.        Patient is not progressing towards the following goals:

## 2022-10-20 NOTE — DISCHARGE PLANNING
:    Notified by RN that MOB was asking for paternity testing resources.  SW met with MOB and provided a list of paternity resources.    Nothing further.

## 2022-10-21 ENCOUNTER — PHARMACY VISIT (OUTPATIENT)
Dept: PHARMACY | Facility: MEDICAL CENTER | Age: 27
End: 2022-10-21
Payer: COMMERCIAL

## 2022-10-21 VITALS
DIASTOLIC BLOOD PRESSURE: 83 MMHG | SYSTOLIC BLOOD PRESSURE: 121 MMHG | HEART RATE: 77 BPM | BODY MASS INDEX: 33.13 KG/M2 | RESPIRATION RATE: 18 BRPM | WEIGHT: 180 LBS | HEIGHT: 62 IN | OXYGEN SATURATION: 97 % | TEMPERATURE: 98 F

## 2022-10-21 PROCEDURE — 3E02340 INTRODUCTION OF INFLUENZA VACCINE INTO MUSCLE, PERCUTANEOUS APPROACH: ICD-10-PCS | Performed by: SPECIALIST

## 2022-10-21 PROCEDURE — A9270 NON-COVERED ITEM OR SERVICE: HCPCS | Performed by: SPECIALIST

## 2022-10-21 PROCEDURE — 700102 HCHG RX REV CODE 250 W/ 637 OVERRIDE(OP): Performed by: SPECIALIST

## 2022-10-21 PROCEDURE — RXMED WILLOW AMBULATORY MEDICATION CHARGE: Performed by: SPECIALIST

## 2022-10-21 PROCEDURE — 700111 HCHG RX REV CODE 636 W/ 250 OVERRIDE (IP): Performed by: SPECIALIST

## 2022-10-21 PROCEDURE — 90686 IIV4 VACC NO PRSV 0.5 ML IM: CPT | Performed by: SPECIALIST

## 2022-10-21 PROCEDURE — 90471 IMMUNIZATION ADMIN: CPT

## 2022-10-21 RX ORDER — IBUPROFEN 800 MG/1
800 TABLET ORAL EVERY 8 HOURS PRN
Qty: 30 TABLET | Refills: 0 | Status: SHIPPED | OUTPATIENT
Start: 2022-10-21

## 2022-10-21 RX ORDER — OXYCODONE HYDROCHLORIDE AND ACETAMINOPHEN 5; 325 MG/1; MG/1
1 TABLET ORAL EVERY 6 HOURS PRN
Qty: 28 TABLET | Refills: 0 | Status: SHIPPED | OUTPATIENT
Start: 2022-10-21 | End: 2022-10-28

## 2022-10-21 RX ADMIN — INFLUENZA A VIRUS A/VICTORIA/2570/2019 IVR-215 (H1N1) ANTIGEN (FORMALDEHYDE INACTIVATED), INFLUENZA A VIRUS A/DARWIN/9/2021 SAN-010 (H3N2) ANTIGEN (FORMALDEHYDE INACTIVATED), INFLUENZA B VIRUS B/PHUKET/3073/2013 ANTIGEN (FORMALDEHYDE INACTIVATED), AND INFLUENZA B VIRUS B/MICHIGAN/01/2021 ANTIGEN (FORMALDEHYDE INACTIVATED) 0.5 ML: 15; 15; 15; 15 INJECTION, SUSPENSION INTRAMUSCULAR at 13:31

## 2022-10-21 RX ADMIN — ACETAMINOPHEN 1000 MG: 500 TABLET ORAL at 04:09

## 2022-10-21 ASSESSMENT — EDINBURGH POSTNATAL DEPRESSION SCALE (EPDS)
I HAVE BEEN SO UNHAPPY THAT I HAVE HAD DIFFICULTY SLEEPING: NOT AT ALL
I HAVE BEEN ANXIOUS OR WORRIED FOR NO GOOD REASON: YES, SOMETIMES
THE THOUGHT OF HARMING MYSELF HAS OCCURRED TO ME: NEVER
I HAVE LOOKED FORWARD WITH ENJOYMENT TO THINGS: AS MUCH AS I EVER DID
THINGS HAVE BEEN GETTING ON TOP OF ME: NO, I HAVE BEEN COPING AS WELL AS EVER
I HAVE BEEN SO UNHAPPY THAT I HAVE BEEN CRYING: NO, NEVER
I HAVE FELT SAD OR MISERABLE: NO, NOT AT ALL
I HAVE BLAMED MYSELF UNNECESSARILY WHEN THINGS WENT WRONG: YES, MOST OF THE TIME
I HAVE BEEN ABLE TO LAUGH AND SEE THE FUNNY SIDE OF THINGS: AS MUCH AS I ALWAYS COULD
I HAVE FELT SCARED OR PANICKY FOR NO GOOD REASON: YES, SOMETIMES

## 2022-10-21 ASSESSMENT — PAIN DESCRIPTION - PAIN TYPE: TYPE: ACUTE PAIN

## 2022-10-21 NOTE — CARE PLAN
The patient is Stable - Low risk of patient condition declining or worsening    Shift Goals  Clinical Goals: maintain vitals, control pain  Patient Goals: healthy mom and baby    Progress made toward(s) clinical / shift goals:      Problem: Altered Physiologic Condition  Goal: Patient physiologically stable as evidenced by normal lochia, palpable uterine involution and vitals within normal limits  Outcome: Progressing  Note: Fundus firm, lochia light.     Problem: Infection - Postpartum  Goal: Postpartum patient will be free of signs and symptoms of infection  Outcome: Progressing  Note: Patient remains free from signs and symptoms of infection, vital signs stable.

## 2022-10-21 NOTE — PROGRESS NOTES
"LABOR PROGRESS NOTE    Patient Name: Gypsy Hitchcock  YOB: 1995  MRN: [id number]    SUBJECTIVE:   Patient is currently 7-/-2. Membranes ruptured with this check, clear fluid. Comfortable with one dose of fentanyl.     OBJECTIVE:  Vitals:    10/19/22 1623 10/19/22 1905 10/20/22 0524   BP: 112/73 112/73    Pulse: 93 93    Resp: 16 16    Temp: 36.1 °C (96.9 °F) 36.1 °C (96.9 °F) 36.9 °C (98.4 °F)   TempSrc: Temporal Temporal Oral   SpO2: 92% 92%    Weight:  81.6 kg (180 lb)    Height:  1.575 m (5' 2\")      SVE: 7-/-2  FHR: baseline 160bpm, moderate variability, +accel, +variable decels following AROM  TOCO: q2-5 min  Category: II    ASSESSMENT/PLAN:  Patient is a 27 y.o. year old  at 38w2d here for induction of labor for elevated dopplers and decreased fetal movement.    -s/p AROM, clear fluid; patient tolerated well.     GBS Status: negative  Membrane Status: AROM  Epidural: none  Pitocin: per protocol    1. Continue monitoring with SVE PRN  2. Amnioinfusion if continues with recurrent variables  2. Anticipate       Ricci Chowdary D.O.  Obstetrics and Gynecology  10/20/2022   5:46 AM   "
1420 27 yr old female here to LDA3 for decreased fetal movement and pelvic pain. . Pt stated EDC: 22, pt is 38w1d. States abdomen has been feeling tense and hard, states no vaginal leakage of fluid or blood. EFM and TOCO placed, 's. Vital signs stable. Pt stated last visit with Dr. Farr on 22, since then has had insurance issues due to loss of employment. States that she has Medicaid emergency. Cervical exam: %/ -2. Report given to Dr. Farr, orders for GBS, prenatal panel, GC/Chlamydia, UA. Fetal heart tones with wandering baseline and questionable decels, BPP order placed, resulting 6/8 with no fetal breathing movement. Mildly elevated dopplers. Repeat SVE 2-3//-3. Vertex presentation. Report to Dr. Chowdary, admit order received. Report to Petty ABRAHAM. Pt transferred to L&D.  
1900: Received report from Vanessa ABRAHAM. POC discussed.  0625:  of viable baby boy.  0700: Gave report to Sonia ABRAHAM. POC discussed.     
Assessment completed, fundus firm, lochia light. POC reviewed, verbalized understanding. Denies pain at this time, will call if pain med intervention needed.    
Assessment completed. Plan of care reviewed with patient. Patient will call for pain medication intervention if needed.    
POST PARTUM DAY # 1  The patient complains of cramping pain.   She says that she feels fine otherwise and she says that she has no other problems or complaints.   She says that she would like to go home today.   The patient's vital signs are stable and she is afebrile.   She appears well developed and well nourished and relaxed and alert and comfortable and in no apparent distress.   Labs: the patient's hemoglobin went from 12.2 grams per deciliter antepartum to 12.1 grams per deciliter post partum.   We will plan on discharge home later today.   Rx's for percocet and ibuprofen.   Follow up in office in about six weeks for a post partum visit.   Shaka Farr M.D.    
carried

## 2022-10-21 NOTE — DISCHARGE INSTRUCTIONS

## 2022-10-21 NOTE — DISCHARGE PLANNING
Meds-to-Beds: Discharge prescription orders listed below delivered to patient's bedside. RN Ofelia notified. Patient counseled. Patient elected to have co-payment billed to patient account.      Current Outpatient Medications   Medication Sig Dispense Refill    oxyCODONE-acetaminophen (PERCOCET) 5-325 MG Tab Take 1 Tablet by mouth every 6 hours as needed for Moderate Pain or Severe Pain for up to 7 days. 28 Tablet 0    ibuprofen (MOTRIN) 800 MG Tab Take 1 Tablet by mouth every 8 hours as needed for Mild Pain or Moderate Pain. 30 Tablet 0      Chitra Wagner, PharmD

## 2022-10-21 NOTE — LACTATION NOTE
This note was copied from a baby's chart.  This is 3rd baby and MOB did not breastfeed any of other children.  MOB requested formula to give baby for feedings.  Explained risks to milk supply if choosing to give formula to baby at this time and mom voices understanding.  Encouraged to call for LC assistance, if desired.